# Patient Record
Sex: FEMALE | Race: WHITE | NOT HISPANIC OR LATINO | Employment: UNEMPLOYED | URBAN - METROPOLITAN AREA
[De-identification: names, ages, dates, MRNs, and addresses within clinical notes are randomized per-mention and may not be internally consistent; named-entity substitution may affect disease eponyms.]

---

## 2021-06-16 ENCOUNTER — TELEPHONE (OUTPATIENT)
Dept: OTOLARYNGOLOGY | Facility: CLINIC | Age: 66
End: 2021-06-16

## 2021-06-16 ENCOUNTER — OFFICE VISIT (OUTPATIENT)
Dept: OTOLARYNGOLOGY | Facility: CLINIC | Age: 66
End: 2021-06-16
Payer: MEDICARE

## 2021-06-16 ENCOUNTER — TELEPHONE (OUTPATIENT)
Dept: NEUROSURGERY | Facility: CLINIC | Age: 66
End: 2021-06-16

## 2021-06-16 DIAGNOSIS — D33.3 ACOUSTIC NEUROMA: Primary | ICD-10-CM

## 2021-06-16 PROCEDURE — 99205 PR OFFICE/OUTPT VISIT, NEW, LEVL V, 60-74 MIN: ICD-10-PCS | Mod: S$GLB,,, | Performed by: OTOLARYNGOLOGY

## 2021-06-16 PROCEDURE — 99999 PR PBB SHADOW E&M-EST. PATIENT-LVL I: CPT | Mod: PBBFAC,,, | Performed by: OTOLARYNGOLOGY

## 2021-06-16 PROCEDURE — 1159F MED LIST DOCD IN RCRD: CPT | Mod: S$GLB,,, | Performed by: OTOLARYNGOLOGY

## 2021-06-16 PROCEDURE — 1126F AMNT PAIN NOTED NONE PRSNT: CPT | Mod: S$GLB,,, | Performed by: OTOLARYNGOLOGY

## 2021-06-16 PROCEDURE — 1126F PR PAIN SEVERITY QUANTIFIED, NO PAIN PRESENT: ICD-10-PCS | Mod: S$GLB,,, | Performed by: OTOLARYNGOLOGY

## 2021-06-16 PROCEDURE — 99205 OFFICE O/P NEW HI 60 MIN: CPT | Mod: S$GLB,,, | Performed by: OTOLARYNGOLOGY

## 2021-06-16 PROCEDURE — 1159F PR MEDICATION LIST DOCUMENTED IN MEDICAL RECORD: ICD-10-PCS | Mod: S$GLB,,, | Performed by: OTOLARYNGOLOGY

## 2021-06-16 PROCEDURE — 99999 PR PBB SHADOW E&M-EST. PATIENT-LVL I: ICD-10-PCS | Mod: PBBFAC,,, | Performed by: OTOLARYNGOLOGY

## 2021-06-18 ENCOUNTER — TELEPHONE (OUTPATIENT)
Dept: NEUROSURGERY | Facility: CLINIC | Age: 66
End: 2021-06-18

## 2021-06-22 ENCOUNTER — OFFICE VISIT (OUTPATIENT)
Dept: NEUROSURGERY | Facility: CLINIC | Age: 66
End: 2021-06-22
Payer: MEDICARE

## 2021-06-22 ENCOUNTER — TELEPHONE (OUTPATIENT)
Dept: NEUROSURGERY | Facility: CLINIC | Age: 66
End: 2021-06-22

## 2021-06-22 DIAGNOSIS — D33.3 ACOUSTIC NEUROMA: ICD-10-CM

## 2021-06-22 PROCEDURE — 1159F PR MEDICATION LIST DOCUMENTED IN MEDICAL RECORD: ICD-10-PCS | Mod: S$GLB,,, | Performed by: NEUROLOGICAL SURGERY

## 2021-06-22 PROCEDURE — 99999 PR PBB SHADOW E&M-EST. PATIENT-LVL III: CPT | Mod: PBBFAC,,, | Performed by: NEUROLOGICAL SURGERY

## 2021-06-22 PROCEDURE — 1101F PR PT FALLS ASSESS DOC 0-1 FALLS W/OUT INJ PAST YR: ICD-10-PCS | Mod: CPTII,S$GLB,, | Performed by: NEUROLOGICAL SURGERY

## 2021-06-22 PROCEDURE — 99204 OFFICE O/P NEW MOD 45 MIN: CPT | Mod: S$GLB,,, | Performed by: NEUROLOGICAL SURGERY

## 2021-06-22 PROCEDURE — 99999 PR PBB SHADOW E&M-EST. PATIENT-LVL III: ICD-10-PCS | Mod: PBBFAC,,, | Performed by: NEUROLOGICAL SURGERY

## 2021-06-22 PROCEDURE — 1101F PT FALLS ASSESS-DOCD LE1/YR: CPT | Mod: CPTII,S$GLB,, | Performed by: NEUROLOGICAL SURGERY

## 2021-06-22 PROCEDURE — 1159F MED LIST DOCD IN RCRD: CPT | Mod: S$GLB,,, | Performed by: NEUROLOGICAL SURGERY

## 2021-06-22 PROCEDURE — 99204 PR OFFICE/OUTPT VISIT, NEW, LEVL IV, 45-59 MIN: ICD-10-PCS | Mod: S$GLB,,, | Performed by: NEUROLOGICAL SURGERY

## 2021-06-22 PROCEDURE — 1126F PR PAIN SEVERITY QUANTIFIED, NO PAIN PRESENT: ICD-10-PCS | Mod: S$GLB,,, | Performed by: NEUROLOGICAL SURGERY

## 2021-06-22 PROCEDURE — 3288F FALL RISK ASSESSMENT DOCD: CPT | Mod: CPTII,S$GLB,, | Performed by: NEUROLOGICAL SURGERY

## 2021-06-22 PROCEDURE — 1126F AMNT PAIN NOTED NONE PRSNT: CPT | Mod: S$GLB,,, | Performed by: NEUROLOGICAL SURGERY

## 2021-06-22 PROCEDURE — 3288F PR FALLS RISK ASSESSMENT DOCUMENTED: ICD-10-PCS | Mod: CPTII,S$GLB,, | Performed by: NEUROLOGICAL SURGERY

## 2021-06-22 RX ORDER — LOSARTAN POTASSIUM 100 MG/1
100 TABLET ORAL
COMMUNITY
Start: 2021-03-18 | End: 2022-03-18

## 2021-06-22 RX ORDER — ALBUTEROL SULFATE 90 UG/1
2 AEROSOL, METERED RESPIRATORY (INHALATION) EVERY 4 HOURS PRN
COMMUNITY
Start: 2021-03-18

## 2021-07-14 ENCOUNTER — OUTSIDE PLACE OF SERVICE (OUTPATIENT)
Dept: NEUROSURGERY | Facility: CLINIC | Age: 66
End: 2021-07-14
Payer: MEDICARE

## 2021-07-14 PROCEDURE — 61800 PR APPLY STEREOTACTIC HEADFRAME FOR RADIOSURGERY: ICD-10-PCS | Mod: ,,, | Performed by: NEUROLOGICAL SURGERY

## 2021-07-14 PROCEDURE — 61798 SRS CRANIAL LESION COMPLEX: CPT | Mod: ,,, | Performed by: NEUROLOGICAL SURGERY

## 2021-07-14 PROCEDURE — 61798 PR STEREOTACTIC RADIOSURGERY, CRANIAL,COMPLEX,SINGLE: ICD-10-PCS | Mod: ,,, | Performed by: NEUROLOGICAL SURGERY

## 2021-07-14 PROCEDURE — 61800 APPLY SRS HEADFRAME ADD-ON: CPT | Mod: ,,, | Performed by: NEUROLOGICAL SURGERY

## 2021-07-16 DIAGNOSIS — D33.3 ACOUSTIC NEUROMA: Primary | ICD-10-CM

## 2021-10-19 ENCOUNTER — OFFICE VISIT (OUTPATIENT)
Dept: NEUROSURGERY | Facility: CLINIC | Age: 66
End: 2021-10-19
Payer: MEDICARE

## 2021-10-19 ENCOUNTER — HOSPITAL ENCOUNTER (OUTPATIENT)
Dept: RADIOLOGY | Facility: HOSPITAL | Age: 66
Discharge: HOME | End: 2021-10-19
Attending: NEUROLOGICAL SURGERY
Payer: MEDICARE

## 2021-10-19 VITALS — HEART RATE: 75 BPM | DIASTOLIC BLOOD PRESSURE: 81 MMHG | SYSTOLIC BLOOD PRESSURE: 114 MMHG

## 2021-10-19 DIAGNOSIS — D33.3 ACOUSTIC NEUROMA: Primary | ICD-10-CM

## 2021-10-19 DIAGNOSIS — D33.3 ACOUSTIC NEUROMA: ICD-10-CM

## 2021-10-19 PROCEDURE — 3074F PR MOST RECENT SYSTOLIC BLOOD PRESSURE < 130 MM HG: ICD-10-PCS | Mod: CPTII,S$GLB,, | Performed by: NEUROLOGICAL SURGERY

## 2021-10-19 PROCEDURE — 1126F PR PAIN SEVERITY QUANTIFIED, NO PAIN PRESENT: ICD-10-PCS | Mod: CPTII,S$GLB,, | Performed by: NEUROLOGICAL SURGERY

## 2021-10-19 PROCEDURE — 99999 PR PBB SHADOW E&M-EST. PATIENT-LVL III: CPT | Mod: PBBFAC,,, | Performed by: NEUROLOGICAL SURGERY

## 2021-10-19 PROCEDURE — 25500020 PHARM REV CODE 255: Performed by: NEUROLOGICAL SURGERY

## 2021-10-19 PROCEDURE — 70553 MRI BRAIN W WO CONTRAST: ICD-10-PCS | Mod: 26,,, | Performed by: RADIOLOGY

## 2021-10-19 PROCEDURE — 1160F RVW MEDS BY RX/DR IN RCRD: CPT | Mod: CPTII,S$GLB,, | Performed by: NEUROLOGICAL SURGERY

## 2021-10-19 PROCEDURE — 4010F PR ACE/ARB THEARPY RXD/TAKEN: ICD-10-PCS | Mod: CPTII,S$GLB,, | Performed by: NEUROLOGICAL SURGERY

## 2021-10-19 PROCEDURE — 3074F SYST BP LT 130 MM HG: CPT | Mod: CPTII,S$GLB,, | Performed by: NEUROLOGICAL SURGERY

## 2021-10-19 PROCEDURE — 1160F PR REVIEW ALL MEDS BY PRESCRIBER/CLIN PHARMACIST DOCUMENTED: ICD-10-PCS | Mod: CPTII,S$GLB,, | Performed by: NEUROLOGICAL SURGERY

## 2021-10-19 PROCEDURE — 99213 PR OFFICE/OUTPT VISIT, EST, LEVL III, 20-29 MIN: ICD-10-PCS | Mod: S$GLB,,, | Performed by: NEUROLOGICAL SURGERY

## 2021-10-19 PROCEDURE — 1159F PR MEDICATION LIST DOCUMENTED IN MEDICAL RECORD: ICD-10-PCS | Mod: CPTII,S$GLB,, | Performed by: NEUROLOGICAL SURGERY

## 2021-10-19 PROCEDURE — 99213 OFFICE O/P EST LOW 20 MIN: CPT | Mod: S$GLB,,, | Performed by: NEUROLOGICAL SURGERY

## 2021-10-19 PROCEDURE — 99999 PR PBB SHADOW E&M-EST. PATIENT-LVL III: ICD-10-PCS | Mod: PBBFAC,,, | Performed by: NEUROLOGICAL SURGERY

## 2021-10-19 PROCEDURE — 4010F ACE/ARB THERAPY RXD/TAKEN: CPT | Mod: CPTII,S$GLB,, | Performed by: NEUROLOGICAL SURGERY

## 2021-10-19 PROCEDURE — 70553 MRI BRAIN STEM W/O & W/DYE: CPT | Mod: TC

## 2021-10-19 PROCEDURE — 1126F AMNT PAIN NOTED NONE PRSNT: CPT | Mod: CPTII,S$GLB,, | Performed by: NEUROLOGICAL SURGERY

## 2021-10-19 PROCEDURE — 3079F DIAST BP 80-89 MM HG: CPT | Mod: CPTII,S$GLB,, | Performed by: NEUROLOGICAL SURGERY

## 2021-10-19 PROCEDURE — 3079F PR MOST RECENT DIASTOLIC BLOOD PRESSURE 80-89 MM HG: ICD-10-PCS | Mod: CPTII,S$GLB,, | Performed by: NEUROLOGICAL SURGERY

## 2021-10-19 PROCEDURE — A9585 GADOBUTROL INJECTION: HCPCS | Performed by: NEUROLOGICAL SURGERY

## 2021-10-19 PROCEDURE — 1159F MED LIST DOCD IN RCRD: CPT | Mod: CPTII,S$GLB,, | Performed by: NEUROLOGICAL SURGERY

## 2021-10-19 PROCEDURE — 70553 MRI BRAIN STEM W/O & W/DYE: CPT | Mod: 26,,, | Performed by: RADIOLOGY

## 2021-10-19 RX ORDER — GADOBUTROL 604.72 MG/ML
10 INJECTION INTRAVENOUS
Status: COMPLETED | OUTPATIENT
Start: 2021-10-19 | End: 2021-10-19

## 2021-10-19 RX ADMIN — GADOBUTROL 10 ML: 604.72 INJECTION INTRAVENOUS at 11:10

## 2021-10-20 LAB
CREAT SERPL-MCNC: 1 MG/DL (ref 0.5–1.4)
SAMPLE: NORMAL

## 2022-09-30 ENCOUNTER — TELEPHONE (OUTPATIENT)
Dept: NEUROSURGERY | Facility: CLINIC | Age: 67
End: 2022-09-30
Payer: MEDICARE

## 2022-09-30 DIAGNOSIS — D33.3 ACOUSTIC NEUROMA: Primary | ICD-10-CM

## 2022-09-30 NOTE — TELEPHONE ENCOUNTER
Patient has been scheduled for imaging appt as well as f/u . Attempted to contact patient to discuss, however voicemail was not set up .   Mailing out appointment reminder today .             ----- Message from Barbara Latif sent at 9/30/2022 12:27 PM CDT -----  Contact: Patient   Type:  Appointment Request      Name of Caller:Patient    Symptoms:follow up / last mri 10/2021  Would the patient rather a call back or a response via [facility]? Call back   Best Call Back Number:[phone]  Additional Information: Please assist

## 2022-10-06 ENCOUNTER — TELEPHONE (OUTPATIENT)
Dept: NEUROSURGERY | Facility: CLINIC | Age: 67
End: 2022-10-06
Payer: MEDICARE

## 2022-10-06 NOTE — TELEPHONE ENCOUNTER
Called pt S/W . Gave him appt schedule. V/U & thx.    ----- Message from Suyapa Perez sent at 10/6/2022 11:14 AM CDT -----  Regarding: appt  Contact: @ [phone] or [phone]  Caller, Tye is requesting an appt for Pt ( wife). Please call to discuss further.

## 2022-10-31 ENCOUNTER — TELEPHONE (OUTPATIENT)
Dept: NEUROSURGERY | Facility: CLINIC | Age: 67
End: 2022-10-31
Payer: MEDICARE

## 2022-10-31 NOTE — TELEPHONE ENCOUNTER
"Returned pt's call  Pt c/o "discomfort" left upper cheek and down neck. Explained why it was unlikely related to the AN she was treated for.\\States she had a bad concussion 6 yrs ago. Explained NSGY does not treat concussions.    Recommended she contact her PCP about the current symptoms. Pt due to see us in 2 wks anyway.    Encouraged patient to call with any further questions or concerns.      ----- Message from Cyndee Klein sent at 10/31/2022  2:58 PM CDT -----  Regarding: Pain  Contact: pt @ [phone]  Pt is calling to speak with nurse about pain in jaw and side. Asking for urgent call back    "

## 2022-11-15 ENCOUNTER — HOSPITAL ENCOUNTER (OUTPATIENT)
Dept: RADIOLOGY | Facility: HOSPITAL | Age: 67
Discharge: HOME | End: 2022-11-15
Attending: NEUROLOGICAL SURGERY
Payer: MEDICARE

## 2022-11-15 ENCOUNTER — OFFICE VISIT (OUTPATIENT)
Dept: NEUROSURGERY | Facility: CLINIC | Age: 67
End: 2022-11-15
Payer: MEDICARE

## 2022-11-15 VITALS
HEART RATE: 94 BPM | DIASTOLIC BLOOD PRESSURE: 72 MMHG | SYSTOLIC BLOOD PRESSURE: 119 MMHG | BODY MASS INDEX: 39.27 KG/M2 | WEIGHT: 230 LBS | OXYGEN SATURATION: 98 % | HEIGHT: 64 IN

## 2022-11-15 DIAGNOSIS — R41.3 MEMORY DEFICIT: ICD-10-CM

## 2022-11-15 DIAGNOSIS — R29.6 FREQUENT FALLS: ICD-10-CM

## 2022-11-15 DIAGNOSIS — S06.0X0A CONCUSSION WITHOUT LOSS OF CONSCIOUSNESS, INITIAL ENCOUNTER: ICD-10-CM

## 2022-11-15 DIAGNOSIS — D33.3 ACOUSTIC NEUROMA: Primary | ICD-10-CM

## 2022-11-15 DIAGNOSIS — R29.898 WEAKNESS OF BOTH UPPER EXTREMITIES: ICD-10-CM

## 2022-11-15 DIAGNOSIS — D33.3 ACOUSTIC NEUROMA: ICD-10-CM

## 2022-11-15 PROCEDURE — 99999 PR PBB SHADOW E&M-EST. PATIENT-LVL IV: ICD-10-PCS | Mod: PBBFAC,,, | Performed by: NEUROLOGICAL SURGERY

## 2022-11-15 PROCEDURE — 3074F SYST BP LT 130 MM HG: CPT | Mod: CPTII,S$GLB,, | Performed by: NEUROLOGICAL SURGERY

## 2022-11-15 PROCEDURE — 3074F PR MOST RECENT SYSTOLIC BLOOD PRESSURE < 130 MM HG: ICD-10-PCS | Mod: CPTII,S$GLB,, | Performed by: NEUROLOGICAL SURGERY

## 2022-11-15 PROCEDURE — 1160F PR REVIEW ALL MEDS BY PRESCRIBER/CLIN PHARMACIST DOCUMENTED: ICD-10-PCS | Mod: CPTII,S$GLB,, | Performed by: NEUROLOGICAL SURGERY

## 2022-11-15 PROCEDURE — 25500020 PHARM REV CODE 255: Performed by: NEUROLOGICAL SURGERY

## 2022-11-15 PROCEDURE — 3008F BODY MASS INDEX DOCD: CPT | Mod: CPTII,S$GLB,, | Performed by: NEUROLOGICAL SURGERY

## 2022-11-15 PROCEDURE — 99214 OFFICE O/P EST MOD 30 MIN: CPT | Mod: S$GLB,,, | Performed by: NEUROLOGICAL SURGERY

## 2022-11-15 PROCEDURE — 1126F PR PAIN SEVERITY QUANTIFIED, NO PAIN PRESENT: ICD-10-PCS | Mod: CPTII,S$GLB,, | Performed by: NEUROLOGICAL SURGERY

## 2022-11-15 PROCEDURE — 3078F DIAST BP <80 MM HG: CPT | Mod: CPTII,S$GLB,, | Performed by: NEUROLOGICAL SURGERY

## 2022-11-15 PROCEDURE — 4010F ACE/ARB THERAPY RXD/TAKEN: CPT | Mod: CPTII,S$GLB,, | Performed by: NEUROLOGICAL SURGERY

## 2022-11-15 PROCEDURE — A9585 GADOBUTROL INJECTION: HCPCS | Performed by: NEUROLOGICAL SURGERY

## 2022-11-15 PROCEDURE — 99214 PR OFFICE/OUTPT VISIT, EST, LEVL IV, 30-39 MIN: ICD-10-PCS | Mod: S$GLB,,, | Performed by: NEUROLOGICAL SURGERY

## 2022-11-15 PROCEDURE — 70553 MRI BRAIN W WO CONTRAST: ICD-10-PCS | Mod: 26,,, | Performed by: RADIOLOGY

## 2022-11-15 PROCEDURE — 3078F PR MOST RECENT DIASTOLIC BLOOD PRESSURE < 80 MM HG: ICD-10-PCS | Mod: CPTII,S$GLB,, | Performed by: NEUROLOGICAL SURGERY

## 2022-11-15 PROCEDURE — 1160F RVW MEDS BY RX/DR IN RCRD: CPT | Mod: CPTII,S$GLB,, | Performed by: NEUROLOGICAL SURGERY

## 2022-11-15 PROCEDURE — 99999 PR PBB SHADOW E&M-EST. PATIENT-LVL IV: CPT | Mod: PBBFAC,,, | Performed by: NEUROLOGICAL SURGERY

## 2022-11-15 PROCEDURE — 4010F PR ACE/ARB THEARPY RXD/TAKEN: ICD-10-PCS | Mod: CPTII,S$GLB,, | Performed by: NEUROLOGICAL SURGERY

## 2022-11-15 PROCEDURE — 1126F AMNT PAIN NOTED NONE PRSNT: CPT | Mod: CPTII,S$GLB,, | Performed by: NEUROLOGICAL SURGERY

## 2022-11-15 PROCEDURE — 70553 MRI BRAIN STEM W/O & W/DYE: CPT | Mod: TC

## 2022-11-15 PROCEDURE — 3008F PR BODY MASS INDEX (BMI) DOCUMENTED: ICD-10-PCS | Mod: CPTII,S$GLB,, | Performed by: NEUROLOGICAL SURGERY

## 2022-11-15 PROCEDURE — 1159F MED LIST DOCD IN RCRD: CPT | Mod: CPTII,S$GLB,, | Performed by: NEUROLOGICAL SURGERY

## 2022-11-15 PROCEDURE — 70553 MRI BRAIN STEM W/O & W/DYE: CPT | Mod: 26,,, | Performed by: RADIOLOGY

## 2022-11-15 PROCEDURE — 1159F PR MEDICATION LIST DOCUMENTED IN MEDICAL RECORD: ICD-10-PCS | Mod: CPTII,S$GLB,, | Performed by: NEUROLOGICAL SURGERY

## 2022-11-15 RX ORDER — GADOBUTROL 604.72 MG/ML
10 INJECTION INTRAVENOUS
Status: COMPLETED | OUTPATIENT
Start: 2022-11-15 | End: 2022-11-15

## 2022-11-15 RX ADMIN — GADOBUTROL 10 ML: 604.72 INJECTION INTRAVENOUS at 11:11

## 2022-11-15 NOTE — PROGRESS NOTES
"Subjective:   I, Nancy Armstrong, attest that this documentation has been prepared under the direction and in the presence of Mervin Ramos MD.     Patient ID: Kassy Delgado is a 67 y.o. female     Chief Complaint: No chief complaint on file.      HPI  Ms. Kassy Delgado is a 67 y.o. woman with acoustic neuroma s/p GSRS (14 Gy to the 50% isodose line) done on 7/14/21, who presents today for her 1 year follow up with MRI brain. This is a pt who presented to me with dizziness that has been progressively worsening and decreasing hearing loss in the left ear. She also then developed numbness to the left side of her face mostly to her cheek and the tongue. MRI brain showed 1.7 mm acoustic neuroma on the left. At the time of our last clinic visit on 10/19/2021, the pt reports she continues to have dizziness that only occurs intermittently. States she has issues with her knee which is affecting her gait as well. Denies having any pain at this time.     Today the pt reports dizziness with secondary difficulty swallowing. Her  reports the dizziness is exacerbated with PT. She unfortunately sustained a concussion 6 years ago at which time the pt had loss of vision for approximately 1 week. She also reports a memory deficit and endorses difficulty with day to day actions. Additionally, pt endorses weakness to the bilateral hands (R>L). Of note, the pt notes intermittent tremors and uncontrollable motions. Described as though she is "falling all the time". She is feeling more anxious due to the reoccurring fear of falling.    Review of Systems   Constitutional:  Negative for activity change, appetite change, fatigue, fever and unexpected weight change.   HENT:  Positive for trouble swallowing. Negative for facial swelling.    Eyes: Negative.    Respiratory: Negative.     Cardiovascular: Negative.    Gastrointestinal:  Negative for diarrhea, nausea and vomiting.   Endocrine: Negative.  "   Genitourinary: Negative.    Musculoskeletal:  Negative for back pain, joint swelling, myalgias and neck pain.   Neurological:  Positive for dizziness, tremors and weakness. Negative for seizures, numbness and headaches.        + memory deficit   Psychiatric/Behavioral:  The patient is nervous/anxious.       Past Medical History:   Diagnosis Date    Acoustic neuroma     Arthritis        Objective:      Vitals:    11/15/22 1252   BP: 119/72   Pulse: 94      Physical Exam  Constitutional:       General: She is not in acute distress.     Appearance: Normal appearance.   HENT:      Head: Normocephalic and atraumatic.   Pulmonary:      Effort: Pulmonary effort is normal.   Musculoskeletal:      Cervical back: Neck supple.   Neurological:      Mental Status: She is alert and oriented to person, place, and time.      GCS: GCS eye subscore is 4. GCS verbal subscore is 5. GCS motor subscore is 6.      Cranial Nerves: No cranial nerve deficit.      Deep Tendon Reflexes: Reflexes abnormal (hyperreflexia).     IMAGING:  MRI Brain W WO Contrast (11/15/2022):  Stable size with decreased enhancement in the previously treated left IAC/CP angle cistern extra-axial mass. No new mass effect or subjacent parenchymal edema.    I have personally reviewed the images with the pt.      I, Dr. Mervin Ramos, personally performed the services described in this documentation. All medical record entries made by the scribe, Nancy Armstrong, were at my direction and in my presence.  I have reviewed the chart and agree that the record reflects my personal performance and is accurate and complete. Mervin Ramos MD. 11/15/2022    Assessment:       Acoustic neuroma.       Plan:   I have personally reviewed the MRI brain with the pt which shows stable size with decreased enhancement in the previously treated left IAC/CP angle cistern extra-axial mass. No new mass effect or subjacent parenchymal edema.    I will refer the pt to a concussion specialist. I  will schedule the patient for neuro-psychology evaluation and MRI cervical spine. I will follow up thereafter.

## 2022-11-15 NOTE — PATIENT INSTRUCTIONS
I have personally reviewed the MRI brain with the pt which shows stable size with decreased enhancement in the previously treated left IAC/CP angle cistern extra-axial mass. No new mass effect or subjacent parenchymal edema.    I will refer the pt to a concussion specialist. I will schedule the patient for neuro-psychology evaluation and MRI cervical spine. I will follow up thereafter.

## 2022-12-06 ENCOUNTER — HOSPITAL ENCOUNTER (OUTPATIENT)
Dept: RADIOLOGY | Facility: HOSPITAL | Age: 67
Discharge: HOME | End: 2022-12-06
Attending: NEUROLOGICAL SURGERY
Payer: MEDICARE

## 2022-12-06 DIAGNOSIS — R29.6 FREQUENT FALLS: ICD-10-CM

## 2022-12-06 DIAGNOSIS — R29.898 WEAKNESS OF BOTH UPPER EXTREMITIES: ICD-10-CM

## 2022-12-06 DIAGNOSIS — S06.0X0A CONCUSSION WITHOUT LOSS OF CONSCIOUSNESS, INITIAL ENCOUNTER: ICD-10-CM

## 2022-12-06 PROCEDURE — 72141 MRI NECK SPINE W/O DYE: CPT | Mod: 26,,, | Performed by: RADIOLOGY

## 2022-12-06 PROCEDURE — 72141 MRI NECK SPINE W/O DYE: CPT | Mod: TC

## 2022-12-06 PROCEDURE — 72141 MRI CERVICAL SPINE WITHOUT CONTRAST: ICD-10-PCS | Mod: 26,,, | Performed by: RADIOLOGY

## 2023-01-23 ENCOUNTER — TELEPHONE (OUTPATIENT)
Dept: NEUROLOGY | Facility: CLINIC | Age: 68
End: 2023-01-23
Payer: MEDICARE

## 2023-02-02 DIAGNOSIS — F07.81 POST-CONCUSSION SYNDROME: Primary | ICD-10-CM

## 2023-02-06 ENCOUNTER — OFFICE VISIT (OUTPATIENT)
Dept: NEUROLOGY | Facility: CLINIC | Age: 68
End: 2023-02-06
Payer: MEDICARE

## 2023-02-06 DIAGNOSIS — F98.8 ATTENTION DEFICIT DISORDER, UNSPECIFIED HYPERACTIVITY PRESENCE: ICD-10-CM

## 2023-02-06 DIAGNOSIS — R41.3 MEMORY DEFICIT: Primary | ICD-10-CM

## 2023-02-06 PROCEDURE — 96116 PR NEUROBEHAVIORAL STATUS EXAM BY PSYCH/PHYS: ICD-10-PCS | Mod: FQ,95,, | Performed by: CLINICAL NEUROPSYCHOLOGIST

## 2023-02-06 PROCEDURE — 96116 NUBHVL XM PHYS/QHP 1ST HR: CPT | Mod: FQ,95,, | Performed by: CLINICAL NEUROPSYCHOLOGIST

## 2023-02-06 PROCEDURE — 99499 NO LOS: ICD-10-PCS | Mod: 95,,, | Performed by: CLINICAL NEUROPSYCHOLOGIST

## 2023-02-06 PROCEDURE — 4010F PR ACE/ARB THEARPY RXD/TAKEN: ICD-10-PCS | Mod: CPTII,95,, | Performed by: CLINICAL NEUROPSYCHOLOGIST

## 2023-02-06 PROCEDURE — 99499 UNLISTED E&M SERVICE: CPT | Mod: 95,,, | Performed by: CLINICAL NEUROPSYCHOLOGIST

## 2023-02-06 PROCEDURE — 4010F ACE/ARB THERAPY RXD/TAKEN: CPT | Mod: CPTII,95,, | Performed by: CLINICAL NEUROPSYCHOLOGIST

## 2023-02-06 NOTE — PROGRESS NOTES
[facility]  Initial Neurological Evaluation Post Concussion  ***CONCUSSION CLINIC     Date: 2/7/2023  Patient: Kassy Delgado  Chart Number: [id number]    Therapy Diagnosis: No diagnosis found.  Physician: Hoang Ferguson III, MD    Physician Orders: OT eval and treat  Medical Diagnosis: F07.81 (ICD-10-CM) - Post-concussion syndrome   Evaluation Date: 2/7/2023  Plan of Care Expiration Period: ***  Insurance Authorization period Expiration: ***  Date of Return to MD: ***  Visit # / Visits Authorized: *** / ***  FOTO: ***/***    Time In:***  Time Out: ***  Total Billable (one on one) Time: *** minutes    Precautions: {IP WOUND PRECAUTIONS OHS:34492}    Subjective     History of Current Condition: Kassy Delgado is a 67 y.o. {MALE/FEMALE:63845} who presents to [facility] for evaluation and treatment secondary to ***. Pt's concussion occurred in *** after ***. Pt reported initial symptoms included ***. Currently, pt is complaining of ***. Deficits***. PMHx: {+ OR -:13119} prior concussions; {+ OR -:57159} personal history of headaches/migraines; {+ OR -:11309} familial history of headaches/migraines; {+ OR -:96483} depression/anxiety; {+ OR -:62125} ADD/ADHD; {+ OR -:00224} learning disability; {+ OR -:82271} neck/shoulder/back pain prior to injury.       HPI  Ms. Kassy Delgado is a 67 y.o. woman with acoustic neuroma s/p GSRS (14 Gy to the 50% isodose line) done on 7/14/21, who presents today for her 1 year follow up with MRI brain. This is a pt who presented to me with dizziness that has been progressively worsening and decreasing hearing loss in the left ear. She also then developed numbness to the left side of her face mostly to her cheek and the tongue. MRI brain showed 1.7 mm acoustic neuroma on the left. At the time of our last clinic visit on 10/19/2021, the pt reports she continues to have  "dizziness that only occurs intermittently. States she has issues with her knee which is affecting her gait as well. Denies having any pain at this time.      Today the pt reports dizziness with secondary difficulty swallowing. Her  reports the dizziness is exacerbated with PT. She unfortunately sustained a concussion 6 years ago at which time the pt had loss of vision for approximately 1 week. She also reports a memory deficit and endorses difficulty with day to day actions. Additionally, pt endorses weakness to the bilateral hands (R>L). Of note, the pt notes intermittent tremors and uncontrollable motions. Described as though she is "falling all the time". She is feeling more anxious due to the reoccurring fear of falling.    Involved Side: ***  Dominant Side: { hand dominance:8057514026}  Date of Onset: ***  Surgical Procedure: ***  Imaging: MRI Brain W WO Contrast (11/15/2022):  Stable size with decreased enhancement in the previously treated left IAC/CP angle cistern extra-axial mass. No new mass effect or subjacent parenchymal edema.     Previous Therapy: ***    Past Medical History/Physical Systems Review:     Past Medical History:  Kassy Delgado  has a past medical history of Acoustic neuroma and Arthritis.    Past Surgical History:  Kassy Delgado  has a past surgical history that includes bilateral shoulder arthroplasty; right hip arthroplasty; right knee arthroplasty; Rewiring of sternum; and Hysterectomy.    Current Medications:  Kassy has a current medication list which includes the following prescription(s): albuterol and losartan.    Allergies:  Review of patient's allergies indicates:   Allergen Reactions    Dicloxacillin Shortness Of Breath    Apazone Other (See Comments)    Iodine Rash        Other: ***    Patient's Goals for Therapy: ***    Pain:  Pain Related Behaviors Observed: {YES/NO:20267} ***  Functional Pain Scale Rating 0-10:   Location: Headache  {NUMBERS " 1-10:06101}/10 on average  {NUMBERS 1-10:47930}/10 at best  {NUMBERS 1-10:49513}/10 at worst  Description: {Pain Description:07935}  Aggravating Factors: {Causes; Pain:18168}  Easing Factors: {Pain (activities that relieve):92399}  Location: Neck/back/shoulder   {NUMBERS 1-10:38846}/10 on average  {NUMBERS 1-10:25952}/10 at best  {NUMBERS 1-10:92978}/10 at worst  Description: {Pain Description:63290}  Aggravating Factors: {Causes; Pain:00829}  Easing Factors: {Pain (activities that relieve):37442}    Occupation:  ***  Working presently: {Work history:41156}  Duties: ***    Functional Limitations/Social History:    Prior Level of Function: ***  Current Level of Function:***  ADLs/IADLs:  Feeding: {FIM List:39974} ***  Bathing: {FIM List:65500}  ***  Dressing/Grooming:  {FIM List:51723} ***  Cooking/Homecare: {FIM List:10521} ***  Computer/phone use: {FIM List:91633} ***  Reading: {NO, YES PAST, YES CURRENT:17245} ***  Functional Mobility:  Bed mobility: {AMB OT NEURO ASSISTANCE:08582}  Roll to left: {AMB OT NEURO ASSISTANCE:94116}  Roll to right: {AMB OT NEURO ASSISTANCE:95059}  Supine to sit: {AMB OT NEURO ASSISTANCE:57765}  Sit to supine: {AMB OT NEURO ASSISTANCE:74504}  Transfers to bed: {AMB OT NEURO ASSISTANCE:87572}  Transfers to toilet: {AMB OT NEURO ASSISTANCE:52153}  Car transfers: {AMB OT NEURO ASSISTANCE:19608}  Wheelchair mobility: {AMB OT NEURO ASSISTANCE:71052}    Home/Living environment : {LIVES WITH:73477}  Home Access: ***  DME: {DME OWNED:44852}     Leisure: {AMB OT HAND LEISURE:83986}    Driving: ***    Adult Vision Questionnaire:   Directions: please check the answer that best describes your situation. If you wear glasses or contact lenses, answer the questions assuming that you are wearing them.   Never = never  Occasionally = less than 1 time/week  Frequently = at least 1 time/week  Always = everyday     Do you have headaches or facial pain? {adult vision questionnarie:14397}  Do you have pain  in your eyes with eye movement? {adult vision questionnarie:68389}  Do you experience neck or shoulder discomfort? {adult vision questionnarie:56466}  Do you have dizziness, light headed or nausea while performing close up work? (computer work; reading; writing) {adult vision questionnarie:64476}  Do you have dizziness, light headed or nausea while performing far distance activities? (driving, tv, movies) {adult vision questionnarie:25871}  Do you experience dizziness when bending down and standing back up, or when getting up quickly? {adult vision questionnarie:17841}  Do you feel unsteady with walking, or drift to one side? {adult vision questionnarie:52103}  Do you feel overwhelmed or anxious while walking in a large department store or walking in a crowd? {adult vision questionnarie:74464}  Do you feel dizzy or off balance when walking down a long hallway or on bold patterned carpeting? {adult vision questionnarie:86560}  Does riding in a car make you feel dizzy or uncomfortable? {adult vision questionnarie:51947}  Do you find yourself with your head tilted to one side? {adult vision questionnarie:67710}  Does your posture tend to be leaning more forward or backward than your used to? {adult vision questionnarie:21976}  Do you experience poor depth perception or have difficulty estimating distances accurately? {adult vision questionnarie:44339}  Do you experience double/overlapping/shadowed vision at far distances? {adult vision questionnarie:72501}  Do you experience double/overlapping/shadowed vision at near distances? {adult vision questionnarie:65688}  Do you experience glare or have sensitivity to bright lights? {adult vision questionnarie:84805}  Do you close or cover one eye with near or far tasks? {adult vision questionnarie:06608}  Do you skip lines or lose your place while reading? {adult vision questionnarie:77176}  Do you use your finger to keep your place on the page? {adult vision  "questionnarie:30307}  Do you tire easily with close-up tasks? {adult vision questionnarie:27351}  Do you experience blurred vision with far distance tasks? (driving, television, movies, chalkboard at school) {adult vision questionnarie:37920}  Do you experience blurred vision with close up tasks? (computer, reading) {adult vision questionnarie:52785}  Do you experience words running together or appearing to move on the page? {adult vision questionnarie:60633}    History:  Have you ever been diagnosed with:  Traumatic brain injury (TBI) or concussion? {YES/NO:20267}  Reading disability? {YES/NO:20267}  Lazy eye? {YES/NO:20267}  Have you ever had an eye operation? {YES/NO:20267}    Dizziness Handicap Inventory: ***/100   16-34= mild   36-52= moderate   >/= 54= severe    Objective     Cognitive Exam  Oriented: {AMB OT NEURO COGNITIVE ORIENTED:59250}  Behaviors: {exam; behavior :12208::"normal","cooperative"}  Follows Commands/attention: {AMB OT NEURO COGNITIVE FOLLOWS COMMANDS:70473}  Communication: {AMB OT NEURO COGNITIVE COMMUNICATION:58928}  Memory: {AMB OT NEURO COGNITIVE MEMORY:28489}  Short Blessed (>10 cognitive impairment):  {Numbers; 1-28:07660} /28   Bearden Cognitive assessment: (less than 26/30 abnormal) {NUMBERS - 1-30:15776}/30  ***  Safety awareness/insight to disability: {ONC PSO AWARENESS:92805}  Coping skills/emotional control: {AMB OT NEURO COGNITIVE COPING SKILLS:00952}  Comments: See speech therapy evaluation for formal cognitive assessments***    Physical Exam  Head Control/Neck Mobility: ***; see PT eval for formal assessments    Oculomotor Exam  Vestibular/Ocular-Motor Screening (VOMS) for Concussion  Visual/ Ocular Motor Test:  Not Tested Headache (0-10) Dizziness (0-10) Nausea (0-10) Fogginess (0-10) Comments    Baseline N/A *** *** *** ***    Smooth pursuits   (1.5 feet left/right of center; 2 times; 30 bpm each direction; horizontal and vertical)  *** *** *** ***    Saccades- horizontal " "(1.5 feet left/right of center; 3 feet away; 10 times; no specific speed)  *** *** *** ***    Saccades- vertical   (1.5 feet up/down of center; 3 feet away; 10 times; no specific speed)  *** *** *** ***    Convergence   (14 pt font; normal 5 cm)  *** *** *** *** Near point (cm):   1.  2.  3.   VOR- horizontal   (14 pt font; 20 degrees rotation left/right; 10 reps; 180 bpm)  *** *** *** *** ***   VOR- vertical   (14 pt font; 20 degrees rotation up/down; 10 times; 180 bpm)  *** *** *** *** ***   Visual motion sensitivity test   (80 degrees left/right; 5 times each direction; 50 bpm)  *** *** *** *** ***     Oculomotor ROM: ***  Eye Alignment: ***  Visual Field: ***  Acuity: {visual acuity OT:09665}    Spontaneous Nystagmus: ***  Gaze Holding Nystagmus: ***  Gaze Holding (No Fixation): NT  Smooth Pursuits: ***  Horizontal: ***   Vertical: ***  Saccades: ***   Horizontal: ***   Vertical: ***  Near Point Convergence (cm): ***   Accommodation (gaze shift between near target (16") and far target (10ft)): ***   Fixation (5 second sustained visual attention): ***    VOR Slow Head Movement: ***  Head Thrust: ***  Dynamic Visual Acuity (DVA) (using *** chart): *** line difference (***, ***)   Head Shake: ***  Cover/Cross Cover: ***  Cover/Uncover: ***    CMS Impairment/Limitation/Restriction for FOTO *** Survey    Therapist reviewed FOTO scores for Kassy Delgado on 2/7/2023.   FOTO documents entered into EPIC - see Media section.    Limitation Score: ***%     Treatment     Treatment Time In: ***  Treatment Time Out: ***  Total Treatment time separate from Evaluation time:***    Kassy participated in dynamic functional therapeutic activities to improve functional performance for ***  minutes, including:  ***    Kassy participated in neuromuscular re-education activities to improve: {AMB PT PROGRESS NEURO RE-ED:09804} for *** minutes. The following activities were included:  ***    Kassy received therapeutic " "exercises to develop {AMB PT PROGRESS OBJECTIVE:56343} for *** minutes including:  ***    Kassy received the following manual therapy techniques: {AMB PT PROGRESS MANUAL THERAPY:17858} were applied to the: *** for *** minutes, including:  ***    Kassy received the following supervised modalities after being cleared for contradictions: {AMB PT SUPERVISED MODES:54347}  ***    Kassy received the following direct contact modalities after being cleared for contraindications: {AMB PT PROGRESS DIRECT CONTACT MODES:27483}  ***  Kassy participated in dynamic functional self care to improve ADL and IADL tasks for ***  minutes, including:  -***     Home Exercises and Patient Education Provided    Education provided:   - Role of OT, goals for OT, scheduling/cancellations, insurance limitations with patient.  - Additional Education provided: ***    Written Home Exercises Provided: {Blank single:85788::"yes","Patient instructed to cont prior HEP"}.  Exercises were reviewed and Kassy was able to demonstrate them prior to the end of the session.    Kassy demonstrated {Desc; good/fair/poor:63831} understanding of the education provided.     See EMR under {Blank single:64003::"Media","Patient Instructions"} for exercises provided {Blank single:62770::"2/7/2023","prior visit"}.    Assessment     Kassy Delgado is a 67 y.o. female referred to outpatient occupational therapy and presents with a medical diagnosis of ***, resulting in {AMB OT NEURO IMPAIRMENTS:26931} and demonstrates limitations as described in the chart below. Pt also reported ***. obj*** *** Pt's score on the DHI indicates a '*** handicap'. Following medical record review it is determined that patient will benefit from occupational therapy services in order to maximize oculomotor functioning and gaze stabilization, habituation for desensitization to environments/movements that elicit/increase symptoms, pt education on mindfulness/relaxation strategies, and " HEP/HAP guidance to improve functional participation with meaningful occupations.***    Patient prognosis is {REHAB PROGNOSIS OHS:42485} due to  ***  Patient will benefit from skilled outpatient Occupational Therapy to address the deficits stated above and in the chart below, provide patient/family education, and to maximize patient's level of independence.     Plan of care discussed with patient: {YES:88384}  Patient's spiritual, cultural and educational needs considered and patient is agreeable to the plan of care and goals as stated below:     Anticipated Barriers for therapy: ***    Medical Necessity is demonstrated by the following  Profile and History Assessment of Occupational Performance Level of Clinical Decision Making Complexity Score   Occupational Profile:   Kassy Delgado is a 67 y.o. female who {LIVES WITH:45737} and is currently {Work history:97369} as ***. Kassy Delgado has difficulty with  {ADLs:63261}  {IADLs:64078}  affecting his/her daily functional abilities. His/her main goal for therapy is ***.     Comorbidities:   {Co-morbidities:06642}    Medical and Therapy History Review:   {History Review:92787}               Performance Deficits    Physical:  {Physical:57695}    Cognitive:  {Cognitive:17193}    Psychosocial:    {Psychosocial:58801}     Clinical Decision Making:  {Desc; low/moderate/high:912972}    Assessment Process:  {Assessment Complexity:83924}    Modification/Need for Assistance:  {Modification:59851}    Intervention Selection:  {Treatment Options:86406}       {Desc; low/moderate/high:625412}  Based on PMHX, co morbidities , data from assessments and functional level of assistance required with task and clinical presentation directly impacting function.       The following goals were discussed with the patient and patient is in agreement with them as to be addressed in the treatment plan.     Goals:  Short Term Goals: *** weeks   ***    Long Term Goals: ***  "weeks   ***    Plan     Certification Period/Plan of care expiration: 2/7/2023 to ***.    Outpatient Occupational Therapy {NUMBERS 1-5:93747} times weekly for {0-10:20507::"0"} weeks to include the following interventions: {[place] PLAN:66521}.    Corinne Rapier, OT      I certify the need for these services furnished under this plan of treatment and while under my care.  ____________________________________ Physician/Referring Practitioner   Date of Signature    "

## 2023-02-06 NOTE — PROGRESS NOTES
NEUROPSYCHOLOGICAL EVALUATION - CONFIDENTIAL    Referring Provider: Mervin Ramos MD  Medical Necessity: Evaluate cognitive and emotional functioning, participate in treatment planning/management, and provide supportive therapy in the setting of concussion   Date Conducted: 2023  Present At Visit: the patient and her partner, Eleuterio  [id number] = 55 minutes  Referral Diagnoses: S06.0X0A (ICD-10-CM) - Concussion without loss of consciousness, initial encounter     R41.3 (ICD-10-CM) - Memory deficit  Consent: The patient expressed an understanding of the purpose of the evaluation and consented to all procedures. She additionally provided consent to speak with her partner, Eleuterio, who was present during the clinical interview. We discussed the limits of confidentiality and discussed an emergency plan.    Telemedicine Details:   Established Patient - Audio Only Telehealth Visit   The patient location is: [place]  The chief complaint leading to consultation is: memory deficit  Visit type: Virtual visit with audio only (telephone)  Total time spent with patient: 55 minutes   The reason for the audio only service rather than synchronous audio and video virtual visit was related to technical difficulties or patient preference/necessity.   Each patient to whom I provide medical services by telemedicine is: (1) informed of the relationship between the physician and patient and the respective role of any other health care provider with respect to management of the patient; and (2) notified that they may decline to receive medical services by telemedicine and may withdraw from such care at any time. Patient verbally consented to receive this service via voice-only telephone call.     ASSESSMENT & PLAN:   Ms. Kassy Delgado is an 67 y.o., female with 12 years of education and pertinent medical history including acoustic neuroma s/p GSRS (14 Gy to the 50% isodose line) done on 21, hearing loss in the  "left ear, dizziness, numbness to the left side of her face mostly to her cheek and the tongue, and swallow difficulty who was referred for a neuropsychological evaluation in the setting of memory changes.       Full report to follow completion of testing.   Problem List Items Addressed This Visit    None  Visit Diagnoses       Memory deficit    -  Primary    Attention deficit disorder, unspecified hyperactivity presence              Thank you for allowing me to assist in Ms. Kassy Delgado's care. If you have any questions, please contact me at [phone].      Tiny Bull, PhD  Licensed Clinical Neuropsychologist  [facility]     CLINICAL INTERVIEW & RECORD REVIEW:     Cognitive Functioning   Cognitive screener: none  Previous evaluation(s): none  Onset & course of difficulty: ADD diagnosed when she was in her 50s. Never took a stimulant. Sustained a concussion 6 years ago that "knocked her eyesight out for 3 days." She and Eleuterio state that she improved, but did not return fully to baseline after this injury. Has noticed more thinking changes over the past two years that seem to have worsened over the past year.    Fluctuations: none  Severity of changes: some could be age-related   Examples:   Attention/Working Memory/Executive Functioning: ADD diagnosed when she was in her 50s. Never took a stimulant. Hyper focuses on interests and blocks out the world >> this is getting worse. Otherwise jumps from one topic to the next. Very easily bored and distracted. If she starts something, normally does a good job finishing it. Her greenhouse and kitchen are extremely well organized, Everything else is less organized. Harder time planning ahead and Eleuterio does 95% of planning.   Processing Speed: slower   Language: sometimes has word finding problems.   Visuospatial: hasn't driven in 3 years. Too dizzy to drive. Situational awareness and sense of what is " "around her is very bad. Has to be careful to not walk into things because she is not aware of what is there. Vision is okay but brain isn't putting it together that something is there. Tracking and reading is okay, just taking more concentration. Says she is glad she doesn't drive anymore because she doesn't feel like she can't read the signs anymore because the car is moving too fast   Learning & Memory: long term memory is good, but sometimes difficulty pulling up information. Cues usually jog her memory.   Exacerbating factors: none  Ameliorating factors: none  Medication for cognition: none     Daily Functioning   ADLs:    Bathing: Independent and without difficulty  Dressing: Independent and without difficulty  Grooming: Independent and without difficulty   Toileting: Independent and without difficulty  Transferring: Independent and without difficulty.  Eating: Independent and without difficulty.   IADLs:    Finances: Eleuterio manages the bills.  Medication Mgmt: Independent and without difficulty  Driving: doesn't drive due to dizziness   Household Mgmt: Independent and without difficulty Cooking/Meal Preparation: Eleuterio cooks. She collaborates on meals and does okay with it.   Shopping: Independent and without difficulty.  Appointment Mgmt: Independent and without difficulty     Psychiatric/Neuropsychiatric Symptoms   Mood: "fine"  Depression: no  Chantell/Hypomania: no  Anxiety: no  Stress: very low other than health concerns   Social Withdrawal: no  Neurovegetative Sxs:  Appetite: stable, but swallowing difficulty interferes.   Sleep: sleeps soundly. Not acting out dreams. Does have an occasional night when doesn't sleep at all. 5 to 7 hours. Sleep apnea has been diagnosed and has a CPAP machine and used it one night but it made the bronchitis worse so not using it.  Has nighttime oxygen and uses that one.   Energy: okay, doesn't have the same stamina as before   Hallucinations: no  Delusional/Paranoid Thinking: " no  Impulsivity: no  Obsessive/Compulsive Behaviors: no  Disinhibition: no  Irritability/Agitation: no  Aggression: no  Apathy/Indifference: no  Other changes in personality: no. Very caring person. Puts everyone else first.      Physical Functioning   Tremor: sometimes when holding her phone her left hand shakes. Difficulty brushing her teeth.   Difficulty walking: bad due to dizziness. Using a cane now. Has a wheelchair if a lot of walking is involved.   Imbalance: holding onto arm of Eleuterio  Falls: no  Weakness: yes  Trouble with fine motor movements: yes. Having more problems with both hands but having more trouble with both now. Can't sew like she used to. Pretty immobile right now.   Other: Almost whole left side of body feels like it's not related to the right side of her body.  Lightheadedness: dizziness  Urinary Urgency: has had a few accidents, both bladder and bowel. Leakage.   Sensory Sxs: taste and smell are good. Vision is good. Hearing reduced - almost completely gone in left ear.   Pain: not really  Physical Exercise Routine: can't due to dizziness   Other: Swallowing difficulty that is worsening. Started before the gamma knife but about a month after it started worsening. Difficulty swishing liquid around in her mouth.        RELEVANT HISTORY  This patient has a past medical history of Acoustic neuroma and Arthritis.    Past Surgical History:   Procedure Laterality Date    bilateral shoulder arthroplasty      HYSTERECTOMY      REWIRING OF STERNUM      right hip arthroplasty      right knee arthroplasty       Neurological History    Headaches/Migraines: yes - history of migraines. Hasn't had any in years.   TBI:   concussion 6 years ago with LOC for unknown duration. Knocked out her eyesight for 3 days.   3 concussions before that, but did well.   Seizures: two seizures in the last 6 to 8 years (about a year apart from one another) - took Keppra until she told the doctor she wasn't taking it anymore.  Abruptly stopped taking gabapentin. No seizures since that time.   Stroke: no  Tumor: acoustic neuroma s/p GSRS (14 Gy to the 50% isodose line) done on 7/14/21   Previous Episodes of Delirium: no  Movement Disorder: no  CNS Infection: no  Other: no       Neurodiagnostics     Results for orders placed or performed during the hospital encounter of 11/15/22   MRI Brain W WO Contrast    Narrative    EXAMINATION:  MRI BRAIN W WO CONTRAST    CLINICAL HISTORY:  acoustic neuroma; Benign neoplasm of cranial nerves    TECHNIQUE:  Multiplanar multisequence MR imaging of the brain was performed before and after the administration of 10 mL Gadavist intravenous contrast.    COMPARISON:  10/19/2021    FINDINGS:  Enhancing extra-axial lesion extending through the left internal auditory canal with rounded cystic component protruding into the CP angle cistern.  The cisternal component as decreased in size from prior examination, now measuring on the order of 1.4 x 1.2 cm (previously 1.7 x 1.5).  Mass effect on the lateral bhaskar slightly improved.  Some subtle T2 hyperintensity within the lateral margin of the bhaskar adjacent to the lesion.  No new enhancement..  The intracanalicular component is stable.  No new enhancement within the adjacent labyrinth in structures.    The right cranial nerve 7 8 complex unremarkable.    The brain appears stable.  Small remote left occipital infarct.  Small remote right cerebellar infarct.  No new major vascular distribution infarct.  No recent or remote hemorrhage.  New edema.  No abnormal parenchymal or leptomeningeal enhancement.    No new extra-axial blood or fluid collections.    The arterial T2 skull base flow voids are preserved.    Bone marrow signal intensity unremarkable.    Bilateral pseudophakia.      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  Subtle parenchymal T2 hyperintensity in the adjacent lateral bhaskar may reflect treatment effects, but there is no corresponding enhancement.    Remote left occipital and right cerebellar infarcts, stable.      Electronically signed by: Tal Elena MD  Date:    11/15/2022  Time:    13:14     Pertinent Lab Work   No results found for: [id number]  No results found for: RPR  No results found for: FOLATE  Lab Results   Component Value Date    TSH 1.50 2021     No results found for: LABA1C, HGBA1C  No results found for: HIV1X2, [id number]    Medications     Current Outpatient Medications   Medication Instructions    albuterol (PROVENTIL/VENTOLIN HFA) 90 mcg/actuation inhaler 2 puffs, Inhalation, Every 4 hours PRN    losartan (COZAAR) 100 mg, Oral     Psychiatric History   Prior Diagnoses: none  History of Trauma/Abuse: daughter committed suicide 17 years ago - this trauma has kept her distracted so they wonder if that is part of what she is going through.   History of Suicide Attempts: no  Current Ideation, Intention, or Plan: no  Homicidal Ideation: no   Medication(s): no  Hospitalization(s): no  Psychotherapy/Counseling: saw a psychologist for 5/6 years around time of daughter's suicide.   Other: no         Substance Use History     Social History     Tobacco Use    Smoking status: Never    Smokeless tobacco: Not on file   Substance and Sexual Activity    Alcohol use: Not on file    Drug use: Not on file    Sexual activity: Not on file     History of abuse/overuse: usually drinks a glass of wine with her meal at night. Quit smoking 10 or 11 years ago. No issues with alcohol or drugs.     Family Neurological & Psychiatric History     No family history on file.  Neurologic: Negative for heritable risk factors.   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 She has two children, late daughter and son who is 40 years old. 4 grandchildren.   Support System: good  Hobbies/Activities: likes to garden and travel, go out to eat, visit with friends  Current Living Situation: lives at home with Eleuterio      OBJECTIVE:     MENTAL STATUS AND OBSERVATIONS:   Appearance: Unable to assess   Alertness: Attentive and alert.   Orientation:   With the exception of being off by 1 for the date ("7th" when it was 6th), she was O x 4.    Gait:  Unable to assess   Psychomotor:  Unable to assess   Handedness:  Right   Vision & Hearing:  Adequate for session   Speech/language: Normal in rate, rhythm, tone, and volume. No significant word finding difficulty observed. Comprehension was normal.   Mood/Affect:  The patients stated mood was "fine." Affect was congruent with stated mood.    Interpersonal Behavior:  Rapport was quickly and easily established    Suicidality/Homicidality: Denied   Hallucinations/Delusions:  None evidenced or endorsed   Thought Content: Logical   Though Processes: Goal-directed   Insight & Judgment:  Appropriate   Participation in Interview:  Full + collateral     PROCEDURES/TESTS ADMINISTERED: Performed a review of pertinent medical records, reviewed limits to confidentiality, conducted a clinical interview, and explained procedures.                      This service was not originating from a related E/M service provided within the previous 7 days nor will  to an E/M service or procedure within the next 24 " hours or my soonest available appointment.  Prevailing standard of care was able to be met in this audio-only visit.

## 2023-02-07 ENCOUNTER — CLINICAL SUPPORT (OUTPATIENT)
Dept: REHABILITATION | Facility: HOSPITAL | Age: 68
End: 2023-02-07
Attending: PSYCHIATRY & NEUROLOGY
Payer: MEDICARE

## 2023-02-07 ENCOUNTER — OFFICE VISIT (OUTPATIENT)
Dept: NEUROLOGY | Facility: CLINIC | Age: 68
End: 2023-02-07
Payer: MEDICARE

## 2023-02-07 VITALS
DIASTOLIC BLOOD PRESSURE: 85 MMHG | WEIGHT: 220 LBS | HEIGHT: 59 IN | SYSTOLIC BLOOD PRESSURE: 129 MMHG | HEART RATE: 79 BPM | BODY MASS INDEX: 44.35 KG/M2

## 2023-02-07 DIAGNOSIS — Z86.018 HISTORY OF ACOUSTIC NEUROMA: ICD-10-CM

## 2023-02-07 DIAGNOSIS — E53.1 PYRIDOXINE DEFICIENCY: ICD-10-CM

## 2023-02-07 DIAGNOSIS — F07.81 POST-CONCUSSION SYNDROME: ICD-10-CM

## 2023-02-07 DIAGNOSIS — R56.9 SEIZURE: ICD-10-CM

## 2023-02-07 DIAGNOSIS — G60.3 IDIOPATHIC PROGRESSIVE NEUROPATHY: ICD-10-CM

## 2023-02-07 DIAGNOSIS — E08.40 DIABETES MELLITUS DUE TO UNDERLYING CONDITION WITH DIABETIC NEUROPATHY, WITHOUT LONG-TERM CURRENT USE OF INSULIN: ICD-10-CM

## 2023-02-07 DIAGNOSIS — E66.01 MORBID OBESITY: ICD-10-CM

## 2023-02-07 DIAGNOSIS — R41.3 MEMORY DEFICIT: ICD-10-CM

## 2023-02-07 DIAGNOSIS — E55.9 VITAMIN D DEFICIENCY, UNSPECIFIED: ICD-10-CM

## 2023-02-07 DIAGNOSIS — S06.0X0S CONCUSSION WITHOUT LOSS OF CONSCIOUSNESS, SEQUELA: Primary | ICD-10-CM

## 2023-02-07 DIAGNOSIS — G62.9 PERIPHERAL POLYNEUROPATHY: ICD-10-CM

## 2023-02-07 PROCEDURE — 1101F PT FALLS ASSESS-DOCD LE1/YR: CPT | Mod: CPTII,S$GLB,, | Performed by: PSYCHIATRY & NEUROLOGY

## 2023-02-07 PROCEDURE — 99205 PR OFFICE/OUTPT VISIT, NEW, LEVL V, 60-74 MIN: ICD-10-PCS | Mod: S$GLB,,, | Performed by: PSYCHIATRY & NEUROLOGY

## 2023-02-07 PROCEDURE — 1159F MED LIST DOCD IN RCRD: CPT | Mod: CPTII,S$GLB,, | Performed by: PSYCHIATRY & NEUROLOGY

## 2023-02-07 PROCEDURE — 4010F ACE/ARB THERAPY RXD/TAKEN: CPT | Mod: CPTII,S$GLB,, | Performed by: PSYCHIATRY & NEUROLOGY

## 2023-02-07 PROCEDURE — 4010F PR ACE/ARB THEARPY RXD/TAKEN: ICD-10-PCS | Mod: CPTII,S$GLB,, | Performed by: PSYCHIATRY & NEUROLOGY

## 2023-02-07 PROCEDURE — 99999 PR PBB SHADOW E&M-EST. PATIENT-LVL III: CPT | Mod: PBBFAC,,,

## 2023-02-07 PROCEDURE — 3288F FALL RISK ASSESSMENT DOCD: CPT | Mod: CPTII,S$GLB,, | Performed by: PSYCHIATRY & NEUROLOGY

## 2023-02-07 PROCEDURE — 1101F PR PT FALLS ASSESS DOC 0-1 FALLS W/OUT INJ PAST YR: ICD-10-PCS | Mod: CPTII,S$GLB,, | Performed by: PSYCHIATRY & NEUROLOGY

## 2023-02-07 PROCEDURE — 3074F SYST BP LT 130 MM HG: CPT | Mod: CPTII,S$GLB,, | Performed by: PSYCHIATRY & NEUROLOGY

## 2023-02-07 PROCEDURE — 3079F PR MOST RECENT DIASTOLIC BLOOD PRESSURE 80-89 MM HG: ICD-10-PCS | Mod: CPTII,S$GLB,, | Performed by: PSYCHIATRY & NEUROLOGY

## 2023-02-07 PROCEDURE — 99205 OFFICE O/P NEW HI 60 MIN: CPT | Mod: S$GLB,,, | Performed by: PSYCHIATRY & NEUROLOGY

## 2023-02-07 PROCEDURE — 3074F PR MOST RECENT SYSTOLIC BLOOD PRESSURE < 130 MM HG: ICD-10-PCS | Mod: CPTII,S$GLB,, | Performed by: PSYCHIATRY & NEUROLOGY

## 2023-02-07 PROCEDURE — 99999 PR PBB SHADOW E&M-EST. PATIENT-LVL III: ICD-10-PCS | Mod: PBBFAC,,,

## 2023-02-07 PROCEDURE — 97162 PT EVAL MOD COMPLEX 30 MIN: CPT

## 2023-02-07 PROCEDURE — 3079F DIAST BP 80-89 MM HG: CPT | Mod: CPTII,S$GLB,, | Performed by: PSYCHIATRY & NEUROLOGY

## 2023-02-07 PROCEDURE — 3008F PR BODY MASS INDEX (BMI) DOCUMENTED: ICD-10-PCS | Mod: CPTII,S$GLB,, | Performed by: PSYCHIATRY & NEUROLOGY

## 2023-02-07 PROCEDURE — 3008F BODY MASS INDEX DOCD: CPT | Mod: CPTII,S$GLB,, | Performed by: PSYCHIATRY & NEUROLOGY

## 2023-02-07 PROCEDURE — 1125F PR PAIN SEVERITY QUANTIFIED, PAIN PRESENT: ICD-10-PCS | Mod: CPTII,S$GLB,, | Performed by: PSYCHIATRY & NEUROLOGY

## 2023-02-07 PROCEDURE — 3288F PR FALLS RISK ASSESSMENT DOCUMENTED: ICD-10-PCS | Mod: CPTII,S$GLB,, | Performed by: PSYCHIATRY & NEUROLOGY

## 2023-02-07 PROCEDURE — 96125 COGNITIVE TEST BY HC PRO: CPT | Mod: 59

## 2023-02-07 PROCEDURE — 1159F PR MEDICATION LIST DOCUMENTED IN MEDICAL RECORD: ICD-10-PCS | Mod: CPTII,S$GLB,, | Performed by: PSYCHIATRY & NEUROLOGY

## 2023-02-07 PROCEDURE — 1125F AMNT PAIN NOTED PAIN PRSNT: CPT | Mod: CPTII,S$GLB,, | Performed by: PSYCHIATRY & NEUROLOGY

## 2023-02-07 NOTE — PROGRESS NOTES
[facility] Occupational Therapy  Initial Neurological Evaluation Post Concussion  CONCUSSION CLINIC     Date: 2/7/2023  Patient: Kassy Delgado  Chart Number: [id number]    Therapy Diagnosis:   Encounter Diagnoses   Name Primary?    Concussion without loss of consciousness, initial encounter     Memory deficit      Physician: Mervin Ramos MD    Physician Orders: OT eval and treat  Medical Diagnosis: F07.81 (ICD-10-CM) - Post-concussion syndrome   Evaluation Date: 2/7/2023  Plan of Care Expiration Period: ***  Insurance Authorization period Expiration: ***  Date of Return to MD: ***  Visit # / Visits Authorized: *** / ***  FOTO: ***/***    Time In:***  Time Out: ***  Total Billable (one on one) Time: *** minutes    Precautions: {IP WOUND PRECAUTIONS OHS:20453}    Subjective     History of Current Condition: Kassy Delgado is a 67 y.o. female who presents to [facility] for evaluation and treatment secondary to ***. Pt's concussion occurred in *** after ***. Pt reported initial symptoms included ***. Currently, pt is complaining of ***. Deficits***. PMHx: {+ OR -:30061} prior concussions; {+ OR -:05587} personal history of headaches/migraines; {+ OR -:18727} familial history of headaches/migraines; {+ OR -:04252} depression/anxiety; {+ OR -:42904} ADD/ADHD; {+ OR -:99427} learning disability; {+ OR -:46415} neck/shoulder/back pain prior to injury.     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 No new mass effect or subjacent parenchymal edema.     Previous Therapy: ***    Past Medical History/Physical Systems Review:     Past Medical History:  Kassy Delgado  has a past medical history of  Acoustic neuroma and Arthritis.    Past Surgical History:  Kassy Delgado  has a past surgical history that includes bilateral shoulder arthroplasty; right hip arthroplasty; right knee arthroplasty; Rewiring of sternum; and Hysterectomy.    Current Medications:  Kassy has a current medication list which includes the following prescription(s): albuterol and losartan.    Allergies:  Review of patient's allergies indicates:   Allergen Reactions    Dicloxacillin Shortness Of Breath    Apazone Other (See Comments)    Iodine Rash        Other: ***    Patient's Goals for Therapy: ***    Pain:  Pain Related Behaviors Observed: {YES/NO:20267} ***  Functional Pain Scale Rating 0-10:   Location: Headache  {NUMBERS 1-10:69022}/10 on average  {NUMBERS 1-10:23676}/10 at best  {NUMBERS 1-10:12359}/10 at worst  Description: {Pain Description:90476}  Aggravating Factors: {Causes; Pain:98769}  Easing Factors: {Pain (activities that relieve):81186}  Location: Neck/back/shoulder   {NUMBERS 1-10:35146}/10 on average  {NUMBERS 1-10:84712}/10 at best  {NUMBERS 1-10:24508}/10 at worst  Description: {Pain Description:22581}  Aggravating Factors: {Causes; Pain:50584}  Easing Factors: {Pain (activities that relieve):36257}    Occupation:  ***  Working presently: {Work history:49501}  Duties: ***    Functional Limitations/Social History:    Prior Level of Function: ***  Current Level of Function:***  ADLs/IADLs:  Feeding: {FIM List:48763} ***  Bathing: {FIM List:62818}  ***  Dressing/Grooming:  {FIM List:70511} ***  Cooking/Homecare: {FIM List:78969} ***  Computer/phone use: {FIM List:54828} ***  Reading: {NO, YES PAST, YES CURRENT:24079} ***  Functional Mobility:  Bed mobility: {AMB OT NEURO ASSISTANCE:91169}  Roll to left: {AMB OT NEURO ASSISTANCE:78092}  Roll to right: {AMB OT NEURO ASSISTANCE:53320}  Supine to sit: {AMB OT NEURO ASSISTANCE:39130}  Sit to supine: {AMB OT NEURO ASSISTANCE:62810}  Transfers to bed: {AMB OT NEURO  ASSISTANCE:03143}  Transfers to toilet: {AMB OT NEURO ASSISTANCE:04326}  Car transfers: {AMB OT NEURO ASSISTANCE:21841}  Wheelchair mobility: {AMB OT NEURO ASSISTANCE:31212}    Home/Living environment : {LIVES WITH:63541}  Home Access: ***  DME: {DME OWNED:32384}     Leisure: {AMB OT HAND LEISURE:66506}    Driving: ***    Adult Vision Questionnaire:   Directions: please check the answer that best describes your situation. If you wear glasses or contact lenses, answer the questions assuming that you are wearing them.   Never = never  Occasionally = less than 1 time/week  Frequently = at least 1 time/week  Always = everyday     Do you have headaches or facial pain? {adult vision questionnarie:86128}  Do you have pain in your eyes with eye movement? {adult vision questionnarie:95590}  Do you experience neck or shoulder discomfort? {adult vision questionnarie:71218}  Do you have dizziness, light headed or nausea while performing close up work? (computer work; reading; writing) {adult vision questionnarie:14154}  Do you have dizziness, light headed or nausea while performing far distance activities? (driving, tv, movies) {adult vision questionnarie:17159}  Do you experience dizziness when bending down and standing back up, or when getting up quickly? {adult vision questionnarie:13011}  Do you feel unsteady with walking, or drift to one side? {adult vision questionnarie:60160}  Do you feel overwhelmed or anxious while walking in a large department store or walking in a crowd? {adult vision questionnarie:99775}  Do you feel dizzy or off balance when walking down a long hallway or on bold patterned carpeting? {adult vision questionnarie:19631}  Does riding in a car make you feel dizzy or uncomfortable? {adult vision questionnarie:36885}  Do you find yourself with your head tilted to one side? {adult vision questionnarie:47167}  Does your posture tend to be leaning more forward or backward than your used to? {adult vision  "questionnarie:85160}  Do you experience poor depth perception or have difficulty estimating distances accurately? {adult vision questionnarie:49143}  Do you experience double/overlapping/shadowed vision at far distances? {adult vision questionnarie:86449}  Do you experience double/overlapping/shadowed vision at near distances? {adult vision questionnarie:25341}  Do you experience glare or have sensitivity to bright lights? {adult vision questionnarie:17638}  Do you close or cover one eye with near or far tasks? {adult vision questionnarie:55174}  Do you skip lines or lose your place while reading? {adult vision questionnarie:76013}  Do you use your finger to keep your place on the page? {adult vision questionnarie:80085}  Do you tire easily with close-up tasks? {adult vision questionnarie:96849}  Do you experience blurred vision with far distance tasks? (driving, television, movies, chalkboard at school) {adult vision questionnarie:21962}  Do you experience blurred vision with close up tasks? (computer, reading) {adult vision questionnarie:84402}  Do you experience words running together or appearing to move on the page? {adult vision questionnarie:22280}    History:  Have you ever been diagnosed with:  Traumatic brain injury (TBI) or concussion? {YES/NO:20267}  Reading disability? {YES/NO:20267}  Lazy eye? {YES/NO:20267}  Have you ever had an eye operation? {YES/NO:20267}    Dizziness Handicap Inventory: ***/100   16-34= mild   36-52= moderate   >/= 54= severe    Objective     Cognitive Exam  Oriented: {AMB OT NEURO COGNITIVE ORIENTED:94688}  Behaviors: {exam; behavior :67217::"normal","cooperative"}  Follows Commands/attention: {AMB OT NEURO COGNITIVE FOLLOWS COMMANDS:17610}  Communication: {AMB OT NEURO COGNITIVE COMMUNICATION:10901}  Memory: {AMB OT NEURO COGNITIVE MEMORY:22170}  Short Blessed (>10 cognitive impairment):  {Numbers; 1-28:23828} /28   Allan Cognitive assessment: (less than 26/30 abnormal) " "{NUMBERS - 1-30:50839}/30  ***  Safety awareness/insight to disability: {ONC PSO AWARENESS:85730}  Coping skills/emotional control: {AMB OT NEURO COGNITIVE COPING SKILLS:38455}  Comments: See speech therapy evaluation for formal cognitive assessments***    Physical Exam  Head Control/Neck Mobility: ***; see PT eval for formal assessments    Oculomotor Exam  Vestibular/Ocular-Motor Screening (VOMS) for Concussion  Visual/ Ocular Motor Test:  Not Tested Headache (0-10) Dizziness (0-10) Nausea (0-10) Fogginess (0-10) Comments    Baseline N/A *** *** *** ***    Smooth pursuits   (1.5 feet left/right of center; 2 times; 30 bpm each direction; horizontal and vertical)  *** *** *** ***    Saccades- horizontal (1.5 feet left/right of center; 3 feet away; 10 times; no specific speed)  *** *** *** ***    Saccades- vertical   (1.5 feet up/down of center; 3 feet away; 10 times; no specific speed)  *** *** *** ***    Convergence   (14 pt font; normal 5 cm)  *** *** *** *** Near point (cm):   1.  2.  3.   VOR- horizontal   (14 pt font; 20 degrees rotation left/right; 10 reps; 180 bpm)  *** *** *** *** ***   VOR- vertical   (14 pt font; 20 degrees rotation up/down; 10 times; 180 bpm)  *** *** *** *** ***   Visual motion sensitivity test   (80 degrees left/right; 5 times each direction; 50 bpm)  *** *** *** *** ***     Oculomotor ROM: ***  Eye Alignment: ***  Visual Field: ***  Acuity: {visual acuity OT:60852}    Spontaneous Nystagmus: ***  Gaze Holding Nystagmus: ***  Gaze Holding (No Fixation): NT  Smooth Pursuits: ***  Horizontal: ***   Vertical: ***  Saccades: ***   Horizontal: ***   Vertical: ***  Near Point Convergence (cm): ***   Accommodation (gaze shift between near target (16") and far target (10ft)): ***   Fixation (5 second sustained visual attention): ***    VOR Slow Head Movement: ***  Head Thrust: ***  Dynamic Visual Acuity (DVA) (using *** chart): *** line difference (***, ***)   Head Shake: ***  Cover/Cross Cover: " ***  Cover/Uncover: ***    Perceptual Testing:   Letter cancellation: ***/34 passing score where testing was completed in *** minute *** seconds, average time for completion is 1 minute 15 seconds  Line Bisection: WNLs ***  Maze Test: ***  seconds, *** error(s)  (Cut point score is 60 seconds or less with 1 error or less)   Trail Making Part A: *** seconds (Average 29 seconds, Deficient > 78 seconds)   Trail Making Part B: *** seconds (Average 75 seconds, Deficient > 273 seconds)  Motor Free Visual Perception Test (MVPT): assesses figure ground, visual discrimination, spatial relationships, visual closure and visual memory, ***/36, a *** score, where testing was competed in *** minutes *** seconds, average time for completion is 7 minutes  R/L discrimination: {Desc; intact/impaired:50366}   Motor Planning Praxis: {Desc; intact/impaired:83288}  Visual motor skills: (9 hole peg test)  RUE:***secs  LUE *** secs    Motion Sensitivity Testing  Movement Intensity (0-5) Duration  < 5 = 0  5 - 10s = 1  11 - 30s = 2  >30s = 3 Score  Intensity + duration score   1. Siting to Supine {drdizzinessratin}     2. Supine to left side {drdizzinessratin}     3. Supine to right side {drdizzinessratin}     4. Supine to sitting {drdizzinessratin}     5. L Ari Hallpike {drdizzinessratin}     6. Up from left {drdizzinessratin}     7. R Sulphur Hallpike {drdizzinessratin}     8. Up from right {drdizzinessratin}     9. Sitting, head tipped to L knee {drdizzinessratin}     10. Head up from left knee {drdizzinessratin}     11. Sitting, head tipped to R knee {drdizzinessratin}     12. Head up from right knee {drdizzinessratin}     13. Sitting head turns (5) {drdizzinessratin}     14. Sitting head pitches (5) {drdizzinessratin}     15. In stance, 180 degree turn to L {drdizzinessratin}     16. In stance, 180 degree turn to R  "{sohailinessratin}       MSQ = Total score x (# of positions with symptoms)/20.48    MSQ = ***    Interpretation:   Mild: 0 - 10  Moderate: 11 - 30  Severe: 31 - 100    Modified VAS (Vertebral Artery Screen), in sitting (rotation, then extension):  R: ***  L: ***    Positional Canal Testing:   Looking for nystagmus (slow phase followed by quick phase to the affected side for BPPV)    Ari Hallpike (posterior / CL anterior)   Right : {POSITIVE/NEGATIVE:04726} nystagmus, {POSITIVE/NEGATIVE:12044} dizziness   Left: {POSITIVE/NEGATIVE:45655} nystagmus, {POSITIVE/NEGATIVE:89691} dizziness  Horizontal Canals   Right: {POSITIVE/NEGATIVE:37890} nystagmus, {POSITIVE/NEGATIVE:63585} dizziness   Left: {POSITIVE/NEGATIVE:18729} nystagmus, {POSITIVE/NEGATIVE:89068} dizziness    Treatment Performed:    ***Epley maneuver performed to treat {LEFT/RIGHT:72767::" "} ***PSCC/HSSC/ASCC BPPV   Position 1: {cpposne} nystagmus lasting *** seconds; {cpposne} dizziness   Position 2: {cpposne} nystagmus lasting *** seconds; {cpposne} dizziness   Position 3: {cpposne} nystagmus lasting *** seconds; {cpposne} dizziness   Position 4: {cpposne} nystagmus lasting *** seconds; {cpposne} dizziness    CMS Impairment/Limitation/Restriction for FOTO *** Survey    Therapist reviewed FOTO scores for Kassy Delgado on 2023.   FOTO documents entered into EPIC - see Media section.    Limitation Score: ***%     Treatment     Treatment Time In: ***  Treatment Time Out: ***  Total Treatment time separate from Evaluation time:***    Kassy participated in dynamic functional therapeutic activities to improve functional performance for ***  minutes, including:  ***    Kassy participated in neuromuscular re-education activities to improve: {AMB PT PROGRESS NEURO RE-ED:40777} for *** minutes. The following activities were included:  ***    Kassy received therapeutic exercises to develop " "{AMB PT PROGRESS OBJECTIVE:38489} for *** minutes including:  ***    Kassy received the following manual therapy techniques: {AMB PT PROGRESS MANUAL THERAPY:35879} were applied to the: *** for *** minutes, including:  ***    Kassy received the following supervised modalities after being cleared for contradictions: {AMB PT SUPERVISED MODES:05650}  ***    Kassy received the following direct contact modalities after being cleared for contraindications: {AMB PT PROGRESS DIRECT CONTACT MODES:70150}  ***  Kassy participated in dynamic functional self care to improve ADL and IADL tasks for ***  minutes, including:  -***     Home Exercises and Patient Education Provided    Education provided:   - Role of OT, goals for OT, scheduling/cancellations, insurance limitations with patient.  - Additional Education provided: ***    Written Home Exercises Provided: {Blank single:75022::"yes","Patient instructed to cont prior HEP"}.  Exercises were reviewed and Kassy was able to demonstrate them prior to the end of the session.    Kassy demonstrated {Desc; good/fair/poor:24113} understanding of the education provided.     See EMR under {Blank single:34671::"Media","Patient Instructions"} for exercises provided {Blank single:04167::"2/7/2023","prior visit"}.    Assessment     Kassy Delgado is a 67 y.o. female referred to outpatient occupational therapy and presents with a medical diagnosis of ***, resulting in {AMB OT NEURO IMPAIRMENTS:08878} and demonstrates limitations as described in the chart below. Pt also reported ***. obj*** *** Pt's score on the DHI indicates a '*** handicap'. Following medical record review it is determined that patient will benefit from occupational therapy services in order to maximize oculomotor functioning and gaze stabilization, habituation for desensitization to environments/movements that elicit/increase symptoms, pt education on mindfulness/relaxation strategies, and HEP/HAP guidance to " improve functional participation with meaningful occupations.***    Patient prognosis is {REHAB PROGNOSIS OHS:82746} due to  ***  Patient will benefit from skilled outpatient Occupational Therapy to address the deficits stated above and in the chart below, provide patient/family education, and to maximize patient's level of independence.     Plan of care discussed with patient: {YES:40112}  Patient's spiritual, cultural and educational needs considered and patient is agreeable to the plan of care and goals as stated below:     Anticipated Barriers for therapy: ***    Medical Necessity is demonstrated by the following  Profile and History Assessment of Occupational Performance Level of Clinical Decision Making Complexity Score   Occupational Profile:   Kassy Delgado is a 67 y.o. female who {LIVES WITH:31483} and is currently {Work history:49029} as ***. Kassy Delgado has difficulty with  {ADLs:11524}  {IADLs:83009}  affecting his/her daily functional abilities. His/her main goal for therapy is ***.     Comorbidities:   {Co-morbidities:51325}    Medical and Therapy History Review:   {History Review:56695}               Performance Deficits    Physical:  {Physical:07477}    Cognitive:  {Cognitive:02263}    Psychosocial:    {Psychosocial:40470}     Clinical Decision Making:  {Desc; low/moderate/high:663807}    Assessment Process:  {Assessment Complexity:96059}    Modification/Need for Assistance:  {Modification:20470}    Intervention Selection:  {Treatment Options:91064}       {Desc; low/moderate/high:618687}  Based on PMHX, co morbidities , data from assessments and functional level of assistance required with task and clinical presentation directly impacting function.       The following goals were discussed with the patient and patient is in agreement with them as to be addressed in the treatment plan.     Goals:  Short Term Goals: *** weeks   ***    Long Term Goals: *** weeks   ***    Plan  "    Certification Period/Plan of care expiration: 2/7/2023 to ***.    Outpatient Occupational Therapy {NUMBERS 1-5:87712} times weekly for {0-10:20507::"0"} weeks to include the following interventions: {[place] PLAN:62098}.    Corinne Rapier, OT      I certify the need for these services furnished under this plan of treatment and while under my care.  ____________________________________ Physician/Referring Practitioner   Date of Signature    "
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
## 2023-02-07 NOTE — PROGRESS NOTES
"[facility]  Speech Therapy Evaluation - [facility]    Date: 2/7/2023     Name: Kassy Delagdo   MRN: [id number]    Therapy Diagnosis: No diagnosis found. Physician: Mervin Ramos MD  Physician Orders: Ambulatory Referral to Speech Therapy   Medical Diagnosis: Post-Concussion Syndrome    Visit # / Visits Authorized:  1 / 1   Date of Evaluation:  2/7/2023   Insurance Authorization Period: 11/15/2022 to 11/15/2023  Plan of Care Certification:    2/7/2023 to ***      Time In:***   Time Out: ***   Procedure Min.   Cognitive Communication Evaluation - including administration, scoring and interpretation   ***     Precautions: {IP WOUND PRECAUTIONS OHS:38276}  Subjective   Date of Onset: ***  History of Current Condition:  Kassy Delgado is a 67 y.o. female who presents to [facility] for evaluation and treatment secondary to post-concussion syndrome. Patient was referred to therapy by Dr. Ferguson [facility]. Patient's concussion occurred 6 years ago after ***. Pt reported initial symptoms included ***. Per Dr. Ramos, " She unfortunately sustained a concussion 6 years ago at which time the pt had loss of vision for approximately 1 week. She also reports a memory deficit and endorses difficulty with day to day actions. Additionally, pt endorses weakness to the bilateral hands (R>L). Of note, the pt notes intermittent tremors and uncontrollable motions. Described as though she is "falling all the time". She is feeling more anxious due to the reoccurring fear of falling."     Currently, pt is complaining of ***.  Patient {denied/endorsed:30750} changes in mood ***. Patient {denied/endorsed:83859} fatigue ***. Patient {DOES/NOT:57633} feel as though she has returned to baseline cognitive communication functioning.    Previous history of:  Previous concussions: {denied/endorsed:83430}  Anxiety: " {denied/endorsed:10530}  Depression: {denied/endorsed:22028}  Learning Disabilities: {denied/endorsed:70469}  ADHD: {denied/endorsed:29107}  Headaches and/or Migraines: {denied/endorsed:02370}    Past Medical History: Kassy Delgado  has a past medical history of Acoustic neuroma and Arthritis.  Kassy Delgado  has a past surgical history that includes bilateral shoulder arthroplasty; right hip arthroplasty; right knee arthroplasty; Rewiring of sternum; and Hysterectomy.  Medical Hx and Allergies: Kassy has a current medication list which includes the following prescription(s): albuterol and losartan.   Review of patient's allergies indicates:   Allergen Reactions    Dicloxacillin Shortness Of Breath    Apazone Other (See Comments)    Iodine Rash       Prior Therapy:  ***  Social History:   Lives with: ***  Family responsibilities: ***  Occupation: ***  Computer usage:  Driving: ***    Education Level: ***    Prior Level of Function: ***   Current Level of Function: ***    Pain:   Location: Headache  {NUMBERS 1-10:71522}/10 currently  {NUMBERS 1-10:02227}/10 on average  {NUMBERS 1-10:02214}/10 at best  {NUMBERS 1-10:38052}/10 at worst  Description: {Pain Description:33270}  Aggravating Factors: {Causes; Pain:51219}  Easing Factors: {Pain (activities that relieve):82351}    Nutrition:  No deficits, Oral, Thin liquids (IDDSI 0) and Regular consistencies (IDDSI 7); no reported swallowing difficulty ***  Patient's Therapy Goals:  ***  Objective   Formal Assessment:    The Cognitive Communication Checklist for Acquired Brain Injury (CCCABI): The CCCABI is a referral tool designed to help flag communication difficulties after brain injury. This questionnaire screens for dysfunction in six domains: Functional Daily Communication, Auditory Comprehension & Information Processing, Expression, Discourse & Social Communication, Reading Comprehension, Written Expression, and Executive Functions &  Self-Regulation. The results of the questionnaire are presented below.    Patient completed the questionnaire and {CCCABI:35592}    For the below categories only the patients self-identified complaints are listed for each domain.    Domain Patient Self-Identified Complaints   Functional Daily Communications Difficulties with:  {CC daily complaints:08067}   Auditory Comprehension & Information Processing Difficulties with:  {CC Auditory Comprehension:22675}   Expression, Discourse & Social Communication Difficulties with:  {CC expression and discourse:64364}   Reading Comprehension Difficulties with:  {CC reading Comprehension:41228}   Written Expression Difficulties with:  {CC Written Expression:42357}   Thinking, Reasoning, Problem Solving, Executive Functions, Self-Regulation Difficulties with:  {CC thinking and reasonin}   Reference: Elizabeth Schuler (2015) Cognitive Communication Checklist for Acquired Brain Injury (CCCABI) [employer] [facility]; Formerly [facility], H 6J2 , [email or web address]      The Repeatable Battery for the Assessment of Neuropsychological Status (RBANS) Version {BLOCK LETTERS (A,B,C):99726} was administered to measure the patient's attention, language, visuospatial/constructional abilities, and immediate and delayed memory. The results are outlined below:    Domain Subtest Total Score Index Score   Immediate Memory List Learning ***   ***    Story Memory ***    Visuospatial/  Constructional Figure Copy ***   ***    Line Orientation ***    Language Picture Naming ***   ***    Semantic Fluency ***    Attention Digit Span ***   ***    Coding ***      Delayed Memory List Recall ***     ***    List Recognition ***     Story Recall ***     Figure Recall ***        Total Scale   ***     Percentile   ***     Descriptor   ***   Interpretation:  Scaled score: mean of 10, standard deviation of 3. Therefore, 16+ = Very Superior; 14-15 = Superior; 12-13 = High Average; 8-11 = Average; 6-7 =  Low Average; 4-5 = Borderline; 3 and below = Extremely Low    Index score: mean of 100, standard deviation of 15. Therefore, 130+ = Very Superior; 120-129 = Superior; 110-119 = High average;  = Average; 80-89 = Low Average; 70-79 = Borderline; 69 and below = Extremely Low. Apparently the most reliable score; factor in education level.    Immediate Memory Score: Recalling information following immediate presentation is assessed through the List Learning and Story Memory subtests. In the List Learning subtest, the patient is given 10 words to remember. This list is presented four times overall. In this subtest, the patient {Desc; did/not:40194} demonstrate learning over the 4 trials. The patient recalled *** items on trial one, *** items on trial two, *** items on trial three, and *** items in trial 4. In the Story Memory subtest, the patient recalled *** details on the first presentation and *** details on the second presentation. Results of this domain indicate ***.  Visuospatial score: Perceiving spatial relations and constructing a spatially accurate copy of a drawing is assessed through the Figure Copy and Line Orientation subtests. The Figure Copy subtest asks the patient to copy a complex line drawing. The patient ***. The Line Orientation subtest the presents the patient with 12 line displays and asks the patient to match two given lines at the bottom to the display at the top.  The patient correctly identified ***/20. Results of this domain indicate ***.  Language score: Naming common items and retrieving learned material is assessed through the Picture Naming and Semantic Fluency subtests. The Picture Naming subtest asks the patient to name 10 line drawings. The patient was able to accurately name ***/10 items. The Semantic Fluency subtest asks the patient to name as many animals as she can in 60 seconds. The patient was able to name *** animals. These results indicate ***.   Attention score: Attending  to, holding and manipulating information presented visually and orally in working memory is assessed with use of the Digit Span and Coding subtests. The Digit Span subtest asks the patient to repeat progressively lengthening strings of numbers. The Coding subtest asks the patient to alternate attention between a key the given work and then to decode symbols to numbers. The patients results on these subtest indicate ***.  Delayed Memory score: Anterograde memory capacity is assessed through the List Recall, List Recognition, Story Recall, and Figure Recall subtests. The List Recall subtest asks the patient to recall items from the list presented at the beginning of the test. The patient was able to recall ***/10 items. The List Recognition subtest has the patient recall whether a word was or was not in the original list. The patient accurately identified whether a word was or was not on the list in *** of 20 items. On the Story Recall subtest, the patient was able to recall *** details. Finally, on the Figure Recall, the patient recalled *** details. Results of this domain indicate ***.    Overall, according to the RBANS research, total Scale index is a good indicator of general cognitive functioning. The patient presents with a *** cognitive communication disorder charaterized by deficits in ***. ***.      The Functional Assessment of Verbal Reasoning and Executive Strategies (FAVRES) was administered to assess subtle cognitive-communication difficulties including complex communication, verbal reasoning, and executive functioning.   The FAVRES is a standardized assessment for adults with acquired brain injury that assesses verbal reasoning, complex comprehension, discourse, and executive functioning during performance on a set of functional tasks. It has a mean of 100 and a standard deviation (SD) of 15. Each of the four FAVRES tasks includes at least five main factors that must be considered in order to draw a  conclusion, requires some degree of inferential thinking, requires discrimination of relevant and irrelevant information, and includes competing answers or foils to prompt the examinee to weigh factors when making a decision. Due to time constraints of the assessment, the following tasks were administered: {FAVRES SUBTESTS:79637}    The FAVRES 5-point scoring system was designed such that a score of 4 or 5 would reflect consideration of the majority of factors in the reasoning problem. Therefore scores of less than 4 reflect flawed performance. Kassy's results are below.     Task 1 - Planning an Event Task 2 - Scheduling  Task 3 - Making a Decision  Task 4 - Building a Case    Accuracy Raw: ***  Percentile: *** %  Standard Score: *** Raw: ***  Percentile: *** %  Standard Score: *** Raw: ***  Percentile: *** %  Standard Score: *** Raw: ***  Percentile: *** %  Standard Score: ***   Rationale  Raw: ***  Percentile: ***%  Standard Score: *** Raw: ***  Percentile: ***%  Standard Score: *** Raw: ***  Percentile: *** %  Standard Score: *** Raw: ***  Percentile: *** %  Standard Score: ***   Time  Raw: ***  Percentile: *** %  Standard Score: *** Raw: ***  Percentile: *** %  Standard Score: *** Raw: ***  Percentile: *** %  Standard Score: *** Raw: ***  Percentile: *** %  Standard Score: ***     Reasoning Subskills    Planning an Event Scheduling Making a Decision Building a Case   Getting the Facts  *** / 5  *** / 5  *** / 5  *** / 5   Eliminating Irrelevant  *** / 1 *** / 1 *** / 1 *** / 1   Weighing Facts  *** / 1  *** / 1   *** / 1   *** / 2    Flexibility  *** /1  *** / 1 *** / 1 *** / 1   Generating  *** / unlimited  ***/ unlimited ***/ unlimited ***/ unlimited   Predicting Consequences  *** / 4  *** / 4 *** / 4  *** / 2   Total Reasoning Subskills  ***  ***  ***  ***       Task 1: Planning an Event  The Planning an Event task requires that the examinee analyze newspaper listings to choose an appropriate event within the  constraints of time, money, and appropriateness to the participants. On this task, Kassy did/did not choose an appropriate event. She {WAS WAS NOT:25979} able to provide three reasons why/was able to provide *** valid valid reasons for the choice made she chose this particular event. This placed her performance in the *** percentile for accuracy, with a standard score of ***, indicating her performance was in the {AVERAGE:50463} range. AVE also scored in the *** percentile for rationale for this task, with a standard score of ***, placing her performance in the {AVERAGE:18037} range. Additionally, the time needed to complete the task was measured. AVE required *** minutes and *** seconds to complete the task, placing her performance is in the *** percentile with a standard score of ***, indicating her performance is in the {AVERAGE:00876} range. Her timing score indicates she {does/does not:78711} require additional time to facilitate accuracy when making decisions. In this task, AVE  presented with strengths in ***. Difficulties were noted in ***. Observations noted during the task included: ***.    The reasoning portion of this subtest requires the patient to consider the most important facts in planning the event, filter irrelevant information, consider relevant choices, generate additional appropriate activities, and make predictions about positive and negative outcomes. Additionally, the patient was asked to generate several activities that would be appropriate for the parameters provided. In one minute, she was able to generate *** relevant choices. These responses indicated {Desc; adequate/inadequate:56257} generative and divergent productions.     Task 2: Scheduling  The Scheduling task requires the examinee to analyze a things to do list and telephone messages to organize daily activities according to priorities and time constraints. On this task, AVE received a standard score of ***  for her accuracy, placing her  performance in the *** percentile. These scores are indicative of AVE 's ability to schedule events at the appropriate time. For rationale scoring, she received a standard score of ***, placing her in the *** percentile which falls in the {AVERAGE:11419} range. AVE 's timed score received a standard score of ***, placing her in the *** percentile, which falls within the {AVERAGE:28696} range. In this task, AVE presented with strengths in ***. Difficulties were noted in ***. Observations noted during the task included: ***.    The reasoning portion of this subtest requires the patient to consider the most important facts in planning the event, filter irrelevant information, consider relevant choices, generate additional appropriate activities, and make predictions about positive and negative outcomes. Additionally, the patient was asked to generate several activities that would be appropriate for the parameters provided. In one minute, she was able to generate *** relevant choices. These responses indicated {Desc; adequate/inadequate:15145} generative and divergent productions.     Task 3: Making a Decision  The Making a Decision task requires that the examinee analyze the transcript of a conversation to choose an appropriate gift within the constraints of the recipients' preferences and physical restrictions. On this task, AVE {did/did not:97361} choose an appropriate gift and {WAS WAS NOT:97716} able to provide adequate rationale for which gifts were and were not chosen. This placed her performance in the *** percentile for accuracy, with a standard score of ***, indicating her performance was in the {AVERAGE:57546} range. She chose {favres choices:30495} option for the gift. AVE also scored in the *** percentile for rationale for this task, with a standard score of ***, placing her performance in the {AVERAGE:87649} range. These scores reflect her ability to  recall important information regarding how she made her decision and the thought process behind her choice. Additionally, the time needed to complete the task was measured. AVE required *** minutes and *** seconds to complete the task, placing her performance is in the *** percentile with a standard score of *** indicating her performance is in the {AVERAGE:31561} range. In this task, AVE presented with strengths in ***. Difficulties were noted in ***. Observations noted during the task included: ***.    The reasoning portion of this subtest requires the patient to consider the most important facts in planning the event, filter irrelevant information, consider relevant choices, generate additional appropriate activities, and make predictions about positive and negative outcomes. Additionally, the patient was asked to generate several activities that would be appropriate for the parameters provided. In one minute, she was able to generate *** relevant choices. These responses indicated {Desc; adequate/inadequate:45977} generative and divergent productions.      Task 4: Building a Case  The Building a Case task requires that the examinee analyze a story to generate an appropriate complaint to a company regarding their service. On this task, AVE chose was able to articulate her complaint; ***however, she was only able to generate ***  solution within her written response. This placed her performance in the *** percentile for accuracy (complaints), with a standard score of ***, indicating her performance was in the {AVERAGE:95478} range. AVE also scored in the *** percentile for rationale for this task, with a standard score of ***, placing her performance in the {AVERAGE:69021} range. These scores reflect AVE's ability to generate a case regarding given information. Additionally, the time needed to complete the task was measured. She required *** minutes and *** seconds to complete the task,  "placing her performance is in the *** percentile with a standard score of *** indicating her performance is in the {AVERAGE:28173} range. In this task, [unfilled] presented with strengths in ***. Difficulties were noted in ***. Observations noted during the task included: ***.    The reasoning portion of this subtest requires the patient to consider the most important facts in planning the event, filter irrelevant information, consider relevant choices, generate additional appropriate activities, and make predictions about positive and negative outcomes. Additionally, the patient was asked to generate several activities that would be appropriate for the parameters provided. In one minute, she was able to generate *** relevant choices. These responses indicated {Desc; adequate/inadequate:36954} generative and divergent productions.     Overall, Patient demonstrates impairments in executive functioning as characterized by ***.    Treatment   Total Treatment Time Separate from Evaluation: {Blank single:42801::"*** minutes","n/a"}   {Blank single:32670::"***","no treatment performed 2/2 time to complete evaluation."}    Education: {OP ST Education:27718} {Actions; was/were:394130} discussed with pt. Patient {and and/or or:56224} family members expressed understanding. ***    Home Program: ***  Assessment     Kassy presents to [facility] status post medical diagnosis of {CC diagnosis:52425}.  Demonstrates impairments including limitations as described in the problem list. She presents with *** characterized by ***. ***  Positive prognostic factors include ***. Negative prognostic factors include ***. {barriers to therapy:26093} Patient will benefit from skilled, outpatient neurological rehabilitation speech therapy.    Rehab Potential: {DESC; POOR/FAIR/GOOD/EXCELLENT:87950}  Pt's spiritual, cultural, and educational needs considered and patient agreeable to plan of care and " goals.    Short Term Goals (*** weeks):   ***Patient will complete selective attention tasks (auditory or visual) with 90% accuracy independently increase selective attention.  ***Patient will sustain attention to complete moderate to complex reasoning tasks for 2 minutes with one request for clarification to increase sustained attention.  ***Patient will use attention shifting strategies to shift attention between two tasks with no more than 3 cues or 90% accuracy to improve alternating attention.   ***Patient will use Goal Plan Action Review strategy to complete moderate to complex reasoning, planning, or organization tasks with 90% accuracy independently to improve functional executive function skills.  ***Patient will complete short term recall tasks after a 5 minutes delay with 90% accuracy  independently  with use of memory strategies to improve recall of information and generalization of memory strategies.  ***Patient will complete high level problem solving based tasks with 90% accuracy independently.  ***Patient will complete mental manipulation tasks with 90% acc to improve working memory.  ***Patient will demonstrate awareness of cognitive-communication difficulties in 1-2 situations in her day in which cognitive deficits could or did compromise efficiency and performance with minimal cueing.   ***Patient will independently implement cognitive/sensory rest periods throughout day after identifying cognitive fatigue including limiting sensory input (sound, light, etc.)   ***Patient will identify two solutions to functional daily problems with thought flexibility and minimal assistance for use of strategies.     ***Patient will independently generate strategies to improve ability to complete tasks with enhanced accuracy and time, based on review of objective previous performance on trials of functional tasks with 80% accuracy.   ***Patient will complete a task to improve attention and memory (I.e. sample  bill paying activity, recipe, or multiple choice comprehension questions to 1 paragraphs) with 80% accuracy and natural environment noise distractions (TV news background, music, etc.).    Long Term Goals (*** weeks):   ***Patient will improve  attention skills to effectively attend to and communicate in {simple/complex:145344} daily living tasks in functional living environment.   ***Patient will demonstrate use of {executive function:23507} during daily living activities to improve safety and awareness in functional living environment.  ***Patient will apply problem-solving strategies {With/no:72814} visual support in daily living functional activities at home and in the community.   *** Patient will predict objective performance on completion of actual tasks to increase independence with required return to daily living environment.   *** Patient will use appropriate memory strategies to schedule and recall weekly activities, express needs and recall names to maintain safety and participate socially in functional living environment.      Plan     Plan of Care Certification Period: 2/7/2023 to ***    Recommended Treatment Plan:  Patient will participate in the [facility] rehabilitation program for speech therapy {NUMBER 1-5:11835} times per week to address her {SLP TREATMENT AREAS:7996097086} deficits, to educate patient and their family, and to participate in a home exercise program.    Follow up will occur at [facility] {[facility]:31544} for skilled Speech Therapy services.    Other Recommendations: ***    Therapist's Name:   Roselia Leigh CCC-SLP   2/7/2023     I CERTIFY THE NEED FOR THESE SERVICES FURNISHED UNDER THIS PLAN OF TREATMENT AND WHILE UNDER MY CARE    Physician Name: _______________________________    Physician Signature: ____________________________
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
## 2023-02-07 NOTE — PROGRESS NOTES
Subjective:       Patient ID: Kassy Delgado is a 67 y.o. female.    Chief Complaint:  Concussion      Consultation Requested by:   Mervin Ramos Md  [address]  [address]  [place] [address]    History of Present Illness  67 year old female presents for evaluation of head injury.  Looking back at her medical record and discussing with the patient the patient had an episode of PRES back at the end of 2016 or at the start of 2017.  She is accompanied by a loved one who has been with her for the last 4 years but since his injury was before then she does not have an accurate  on the time line.  She did see a neurologist twice in [place] who had MRIs that show resolving posterior reversible encephalopathy syndrome.  EEGs at the time showed no seizure.  The patient notes that she was placed on Keppra initially but then taken off of it and has not had any sort of seizure-like episodes since.  We have discussed the typical etiology of posterior reversible encephalopathy syndrome the patient notes that she has never been on immunosuppressants and at that time was not hypertensive.  Complicating matters still was she did hit her head at that time but she also complains of other issues that have evolved over time.  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  I have reviewed the patient's imaging interpreted the images for the patient and her partner.  We have discussed that due to the maximum size that the acoustic schwannoma group, it did impinge on her left cranial nerve 8.  As a result the patient has lingering left-sided sensorineural hearing loss and she complains of dizziness.  She notes the dizziness his main complaint and this has been occurring since at least 2017 and then she had gamma knife ablation in July of 2021.  Going back to her visit after her hospitalization for posterior reversible encephalopathy syndrome in 2017, there  was mention of a small vestibular schwannoma in the outside neurologist note however there is not a mention of the size of the schwannoma on that documentation, unfortunately.  The patient's 2nd complaint and that being cognitive issues, knowing that she feels unsafe to drive because she does not think she has a reaction time and would not make the correct decisions to drive safely.  She notes a 3rd complaint is her depth perception for at least 4 years.  She did have cataract surgery in July of 2022.  On reviewing the patient's MRI results from 2018, there is an area of gliosis in the left occipital lobe and I have explained at length to the patient and her partner how this could impair visual acuity despite having had a successful cataract surgery.  She notes a 4th complaint of swallowing issues, chewing issues and breathing issues.  The patient does have a history of COPD and is being treated by a pulmonologist for that.  We have reviewed her imaging that most recently occurred in November of 2022 and I have interpreted the images for the patient and her partner, showing that she is had a reduction of size of her acoustic schwannoma due to successful gamma knife treatment.  I have also looked back at her largest images of her schwannoma and there is shift of the brainstem towards the right.  I have explained that compression and shift of the brainstem could cause problems with the motor tracts that descend from the brain but also given its location, could cause problems with some swallowing.  She notes that she does also complain of numbness from her head to her toes usually on the left side of her body.  She did have a cervical spine fusion back in the year 2000 and has updated cervical spine imaging which we have reviewed and interpreted for the patient today explaining that she has multilevel degenerative changes but nothing that looks like it would require urgent surgical intervention.  There is no myelopathy  compressing the cervical spinal cord.  She notes that this numbness has gotten worse in the last year and she feels as if her left hand is more numb than her right hand.  I have again explained that the location of her acoustic schwannoma and the size of it was washed enough to compressing shift the brainstem off of midline, which would explain some of these other issues.    She has filled out a postconcussion symptom questionnaire and scored the following:  Four, severe problem for dizziness, noise sensitivity, poor concentration, taking longer to think, light sensitivity   Three, moderate problem for sleep disturbance, feeling frustrated, forgetfulness, restlessness   Two, mild problem for blurred vision   One, no more problem for being irritable, feeling depressed   0, not experience at all for headaches, nausea, sleep disturbance, double vision    Past Medical History:   Diagnosis Date    Acoustic neuroma     Arthritis        Past Surgical History:   Procedure Laterality Date    bilateral shoulder arthroplasty      HYSTERECTOMY      REWIRING OF STERNUM      right hip arthroplasty      right knee arthroplasty         No family history on file.    Social History     Socioeconomic History    Marital status:    Tobacco Use    Smoking status: Never       Review of Systems  Review of Systems   Constitutional:  Positive for activity change and fatigue.   Eyes:  Positive for visual disturbance.   Gastrointestinal:  Negative for nausea.   Musculoskeletal:  Positive for back pain and neck stiffness.   Neurological:  Positive for dizziness and numbness. Negative for seizures.   Psychiatric/Behavioral:  Positive for sleep disturbance.    All other systems reviewed and are negative.    Objective:     Vitals:    02/07/23 1037   BP: 129/85   Pulse: 79      Physical Exam  Vitals reviewed.   HENT:      Head: Normocephalic.   Eyes:      Extraocular Movements: EOM normal.      Pupils: Pupils are equal, round, and reactive  to light.   Pulmonary:      Effort: Pulmonary effort is normal.   Neurological:      Mental Status: She is alert and oriented to person, place, and time.      Deep Tendon Reflexes:      Reflex Scores:       Tricep reflexes are 2+ on the right side and 1+ on the left side.       Bicep reflexes are 2+ on the right side and 1+ on the left side.       Brachioradialis reflexes are 2+ on the right side and 1+ on the left side.       Patellar reflexes are 2+ on the right side and 1+ on the left side.  Psychiatric:         Speech: Speech normal.         NEUROLOGICAL EXAMINATION:     MENTAL STATUS   Oriented to person, place, and time.   Registration: recalls 2 of 3 objects. Recall at 5 minutes: recalls 1 of 3 objects.   Attention: decreased. Concentration: decreased.   Speech: speech is normal   Level of consciousness: alert    CRANIAL NERVES     CN II   Visual acuity: (decreased visual acuity in right bayron field, bilateral)    CN III, IV, VI   Pupils are equal, round, and reactive to light.  Extraocular motions are normal.     CN V   Facial sensation intact.     CN VII   Facial expression full, symmetric.     CN VIII   Hearing: impaired (SNHL in left ear)    CN IX, X   CN IX normal.   CN X normal.     CN XI   CN XI normal.     CN XII   CN XII normal.     MOTOR EXAM   Muscle bulk: normal  Overall muscle tone: normal  Left arm tone: increased  Left leg tone: increased    Strength   Strength 5/5 except as noted.   Left iliopsoas: 4/5    REFLEXES     Reflexes   Right brachioradialis: 2+  Left brachioradialis: 1+  Right biceps: 2+  Left biceps: 1+  Right triceps: 2+  Left triceps: 1+  Right patellar: 2+  Left patellar: 1+  Assessment/Plan:     Problem List Items Addressed This Visit          Neuro    Seizure       Endocrine    Morbid obesity     Other Visit Diagnoses       Concussion without loss of consciousness, sequela    -  Primary    Memory deficit        Relevant Orders    Ambulatory referral/consult to Neuropsychology     History of acoustic neuroma        Relevant Orders    Ambulatory referral/consult to Neuropsychology    Peripheral polyneuropathy        Relevant Orders    Hemoglobin A1C    TSH    T3    Vitamin D    Vitamin B6    Vitamin B2    Vitamin B1    Vitamin B12 Deficiency Panel    Heavy Metals Screen, Blood (Quantitative)    Diabetes mellitus due to underlying condition with diabetic neuropathy, unspecified        Relevant Orders    Hemoglobin A1C    TSH    Vitamin D deficiency, unspecified        Relevant Orders    Vitamin D    Pyridoxine deficiency        Relevant Orders    Vitamin B6    Idiopathic progressive neuropathy        Relevant Orders    Vitamin B12 Deficiency Panel    Ambulatory referral/consult to Neuropsychology          68yo female presents for a complicated constellation of symptoms after multiple neurologic insults.  I have explained to the patient at length today after having discussed with my concussion rehabilitation team, that I do not believe that she is directly dealing with anything related to concussion.  I have explained that the injury that she had 6 years ago from documentation is posterior reversible encephalopathy syndrome with lingering left PCA territory gliosis.  We have also discussed that at the apex of its size, her left acoustic schwannoma likely caused compressive damage to her left cranial nerve 8 causing lingering deficits with regards to dizziness and hearing.  We have also discussed that since there was midline shift and compression of part of the brainstem, some of the swallowing and some of the left hemibody sensory abnormalities that she complains of are also likely related to that damage.  I have discussed with the patient that she has had a life-saving treatment that reduce the size of her left-sided acoustic schwannoma after likely compression of cranial nerve 8 on the left and midline shift of the brainstem.  We have discussed that because these events occurred she now has a  new normal with likely lifelong dizziness, bayron sensory disturbance and due to the gliosis on the left occiput, visual acuity problems for the rest of her life.  There are some concerning issues with regards to her memory, and we have discussed that she has formal neuropsychological testing coming up in the next week or so.  I will see her back in 3 months after checking his formal neuropsychological testing.  I have told the patient and her partner that in my differential there may be a possibility of a neurodegenerative issue, however further neuropsychological testing should help us differentiate with this.  I will send reversible causes of neuropathy and cognitive impairment as outlined above with the vitamins and heavy metal screen and evaluation for thyroid abnormalities and diabetes.  I will see the patient back in about 3 months.  I have advised the patient to try to go back to her ENT to see if he agrees with her dizziness being part of a new normal condition from damage to the 8th cranial nerve.      The patient verbalizes understanding and agreement with the treatment plan. Questions were sought and answered to her stated verbal satisfaction.        Luz Ferguson MD    This note is dictated on M*Modal Fluency speech recognition program. There are word recognition mistakes that are occasionally missed on review.  Based on our encounter today, my overall Medical Decision Making is a Level 5 because of High = 1 or more chronic illnesses with severe exacerbation, progression, or side effects of treatment; 1 acute or chronic illness or injury that poses a threat to life or bodily function and Extensive = Review of prior external note(s) from each unique source; Review of the result(s) of each unique test; Ordering of each unique test; and Assessment requiring an independent historian(s) AND Independent interpretation of a test performed by another physician/other qualified health care professional (not  separately reported) AND/OR Discussion of management or test interpretation with external physician/other qualified health care professional\appropriate source (not separately reported)  based on Number of Problems or Complexity of Problems and Amount and/or Complexity of Data to be Reviewed and Analyzed

## 2023-02-07 NOTE — PATIENT INSTRUCTIONS
STRATEGIES FOR COGNITIVE FUNCTIONS  Attention:   Reduce distractions in the area as much as possible.  Look at the person you are talking to   Write down important pieces of information which you may need to reference later  Ask them to repeat if you find yourself not paying attention  Have visual cues to remind you if you need to do something later.  Attention in Conversation  Listen Actively  Eliminate distractions  Ask Questions   Paraphrase  Processing Speed:   Using multiple ways of learning new information- e.g., listening, writing notes, asking questions, recording  Allowing sufficient time to complete tasks will reduce frustration and help to ensure completion.  Executive Functioning:  Dont attempt to multi-task.  Separate tasks so that each of the tasks has separate, designated times  Consider using a calendar/day planner, as that may be effective to help you plan and stay on track.  Color-coding specific tasks by importance may add additional benefit to your planner.  Break down large projects into smaller tasks and write down the steps to completing the task.    Taking notes while reading can help with recall.  Make the environment the best you can (e.g., turning off the TV, reducing background noise, lighting)  Memory  Rehearse - Immediately after seeing/hearing something, try to recall it.  Wait a few minutes, then check again.  Gradually lengthen the intervals between rehearsals.  Repetition -- reciting the information you heard silently or aloud  Write down important information to improve your attention and focus and to have something to look back on when you need to recall it.  Asking for Clarification - asking the person to repeat or rephrase what they said  Visualization - picturing information in your head to help the brain better organize the information  The weirder, funnier, and more elaborate, the better  Use a cue - Symbolic reminders (the proverbial string around the finger) are helpful.   "So too are memos, timers, calendar notes, etc.--keep them in visible, appropriate places.  Get organized - Have fixed locations for all important papers, key phone numbers, medications, keys, wallet, glasses, tools, etc.  Develop routines - Routines can anchor memories so they do not drift away.    Use a Calendar system - Benefits include:  All information is in one place  You rely less on your memory alone  Will help with establishing routines  Will have a record of what you need to to, but also what you have completed  Word Finding:  Try to think of the first letter  Describe it  Think of related words  State the category or topic  Use a similar word    TIPS FOR CONSERVING ENERGY  Sleep Enough  Take Breaks  Exercise  Pace yourself    Be open to help  Avoid interruptions  Cut distractions  Keep it simple     References:JOSLYN Dias, HITESH Richey, DREAD Driver, JEFFREY Monroe, &amp; HARISH Fong (2009, July). Cognitive Symptom Management and Rehabilitation Therapy (CogSMART) for Traumatic Brain Injury.     Twelve Simple Tips to Improve Your Sleep  [email or web address]    Falling asleep may seem like an impossible dream when youre awake at 3 a.m., but good sleep is more under your control than you might think. Following healthy sleep habits can make the difference between restlessness and restful slumber. Researchers have identified a variety of practices and habits--known as sleep hygiene"--that can help anyone maximize the hours they spend sleeping, even those whose sleep is affected by insomnia, jet lag, or shift work.    Sleep hygiene may sound unimaginative, but it just may be the best way to get the sleep you need in this 24/7 age. Here are some simple tips for making the sleep of your dreams a nightly reality:    #1 Avoid Caffeine, Alcohol, Nicotine, and Other Chemicals that Interfere with Sleep  Caffeinated products decrease a persons quality of sleep. As any coffee lover " "knows, caffeine is a stimulant that can keep you awake. So avoid caffeine (found in coffee, tea, chocolate, cola, and some pain relievers) for four to six hours before bedtime. Similarly, smokers should refrain from using tobacco products too close to bedtime.    Although alcohol may help bring on sleep, after a few hours it acts as a stimulant, increasing the number of awakenings and generally decreasing the quality of sleep later in the night. It is therefore best to limit alcohol consumption to one to two drinks per day, or less, and to avoid drinking within three hours of bedtime.    #2 Turn Your Bedroom into a Sleep-Inducing Environment  A quiet, dark, and cool environment can help promote sound slumber. Why do you think bats congregate in caves for their daytime sleep? To achieve such an environment, lower the volume of outside noise with earplugs or a "white noise" appliance. Use heavy curtains, blackout shades, or an eye mask to block light, a powerful cue that tells the brain that it's time to wake up. Keep the temperature comfortably cool--between 60 and 75°F--and the room well ventilated. And make sure your bedroom is equipped with a comfortable mattress and pillows. (Remember that most mattresses wear out after ten years.)    Also, if a pet regularly wakes you during the night, you may want to consider keeping it out of your bedroom.     It may help to limit your bedroom activities to sleep and sex only. Keeping computers, TVs, and work materials out of the room will strengthen the mental association between your bedroom and sleep.    #3 Establish a Soothing Pre-Sleep Routine  Light reading before bed is a good way to prepare yourself for sleep.  Ease the transition from wake time to sleep time with a period of relaxing activities an hour or so before bed. Take a bath (the rise, then fall in body temperature promotes drowsiness), read a book, watch television, or practice relaxation exercises. Avoid " "stressful, stimulating activities--doing work, discussing emotional issues. Physically and psychologically stressful activities can cause the body to secrete the stress hormone cortisol, which is associated with increasing alertness. If you tend to take your problems to bed, try writing them down--and then putting them aside.     #4 Go to Sleep When Youre Truly Tired  Struggling to fall sleep just leads to frustration. If youre not asleep after 20 minutes, get out of bed, go to another room, and do something relaxing, like reading or listening to music until you are tired enough to sleep.    #5 Dont Be a Nighttime Clock-Watcher  Staring at a clock in your bedroom, either when you are trying to fall asleep or when you wake in the middle of the night, can actually increase stress, making it harder to fall asleep. Turn your clocks face away from you.     And if you wake up in the middle of the night and cant get back to sleep in about 20 minutes, get up and engage in a quiet, restful activity such as reading or listening to music. And keep the lights dim; bright light can stimulate your internal clock. When your eyelids are drooping and you are ready to sleep, return to bed.    #6 Use Light to Your Advantage  Natural light keeps your internal clock on a healthy sleep-wake cycle. So let in the light first thing in the morning and get out of the office for a sun break during the day.     #7 Keep Your Internal Clock Set with a Consistent Sleep Schedule  Having a regular sleep schedule helps to ensure better quality and consistent sleep.  Going to bed and waking up at the same time each day sets the bodys "internal clock" to expect sleep at a certain time night after night. Try to stick as closely as possible to your routine on weekends to avoid a Monday morning sleep hangover. Waking up at the same time each day is the very best way to set your clock, and even if you did not sleep well the night before, the extra " sleep drive will help you consolidate sleep the following night. Learn more about the importance of synchronizing the clock in The Drive to Sleep and Our Internal Clock.     #8 Nap Early--Or Not at All  Many people make naps a regular part of their day. However, for those who find falling asleep or staying asleep through the night problematic, afternoon napping may be one of the culprits. This is because late-day naps decrease sleep drive. If you must nap, its better to keep it short and before 5 p.m.    #9 Lighten Up on Evening Meals  Eating a pepperoni pizza at 10 p.m. may be a recipe for insomnia. Finish dinner several hours before bedtime and avoid foods that cause indigestion. If you get hungry at night, snack on foods that (in your experience) won't disturb your sleep, perhaps dairy foods and carbohydrates.    #10 Balance Fluid Intake  Drink enough fluid at night to keep from waking up thirsty--but not so much and so close to bedtime that you will be awakened by the need for a trip to the bathroom.    #11 Exercise Early  Exercise helps promote restful sleep if it is done several hours before you go to bed.  Exercise can help you fall asleep faster and sleep more soundly--as long as it's done at the right time. Exercise stimulates the body to secrete the stress hormone cortisol, which helps activate the alerting mechanism in the brain. This is fine, unless you're trying to fall asleep. Try to finish exercising at least three hours before bed or work out earlier in the day.    #12 Follow Through  Some of these tips will be easier to include in your daily and nightly routine than others. However, if you stick with them, your chances of achieving restful sleep will improve. That said, not all sleep problems are so easily treated and could signify the presence of a sleep disorder such as apnea, restless legs syndrome, narcolepsy, or another clinical sleep problem. If your sleep difficulties dont improve through  good sleep hygiene, you may want to consult your physician or a sleep specialist.
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
## 2023-02-16 ENCOUNTER — OFFICE VISIT (OUTPATIENT)
Dept: NEUROLOGY | Facility: CLINIC | Age: 68
End: 2023-02-16
Payer: MEDICARE

## 2023-02-16 DIAGNOSIS — F43.23 ADJUSTMENT DISORDER WITH MIXED ANXIETY AND DEPRESSED MOOD: ICD-10-CM

## 2023-02-16 DIAGNOSIS — R41.3 MEMORY DEFICIT: Primary | ICD-10-CM

## 2023-02-16 DIAGNOSIS — S06.0X0A CONCUSSION WITHOUT LOSS OF CONSCIOUSNESS, INITIAL ENCOUNTER: ICD-10-CM

## 2023-02-16 PROCEDURE — 96132 NRPSYC TST EVAL PHYS/QHP 1ST: CPT | Mod: S$GLB,,, | Performed by: CLINICAL NEUROPSYCHOLOGIST

## 2023-02-16 PROCEDURE — 96133 PR NEUROPSYCHOLOGIC TEST EVAL SVCS, EA ADDTL HR: ICD-10-PCS | Mod: S$GLB,,, | Performed by: CLINICAL NEUROPSYCHOLOGIST

## 2023-02-16 PROCEDURE — 96139 PSYCL/NRPSYC TST TECH EA: CPT | Mod: S$GLB,,, | Performed by: CLINICAL NEUROPSYCHOLOGIST

## 2023-02-16 PROCEDURE — 96139 PR PSYCH/NEUROPSYCH TEST ADMIN/SCORING, BY TECH, 2+ TESTS, EA ADDTL 30 MIN: ICD-10-PCS | Mod: S$GLB,,, | Performed by: CLINICAL NEUROPSYCHOLOGIST

## 2023-02-16 PROCEDURE — 3044F PR MOST RECENT HEMOGLOBIN A1C LEVEL <7.0%: ICD-10-PCS | Mod: CPTII,S$GLB,, | Performed by: CLINICAL NEUROPSYCHOLOGIST

## 2023-02-16 PROCEDURE — 99999 PR PBB SHADOW E&M-EST. PATIENT-LVL I: ICD-10-PCS | Mod: PBBFAC,,, | Performed by: CLINICAL NEUROPSYCHOLOGIST

## 2023-02-16 PROCEDURE — 4010F ACE/ARB THERAPY RXD/TAKEN: CPT | Mod: CPTII,S$GLB,, | Performed by: CLINICAL NEUROPSYCHOLOGIST

## 2023-02-16 PROCEDURE — 96132 PR NEUROPSYCHOLOGIC TEST EVAL SVCS, 1ST HR: ICD-10-PCS | Mod: S$GLB,,, | Performed by: CLINICAL NEUROPSYCHOLOGIST

## 2023-02-16 PROCEDURE — 96138 PSYCL/NRPSYC TECH 1ST: CPT | Mod: S$GLB,,, | Performed by: CLINICAL NEUROPSYCHOLOGIST

## 2023-02-16 PROCEDURE — 99499 UNLISTED E&M SERVICE: CPT | Mod: S$GLB,,, | Performed by: CLINICAL NEUROPSYCHOLOGIST

## 2023-02-16 PROCEDURE — 3044F HG A1C LEVEL LT 7.0%: CPT | Mod: CPTII,S$GLB,, | Performed by: CLINICAL NEUROPSYCHOLOGIST

## 2023-02-16 PROCEDURE — 4010F PR ACE/ARB THEARPY RXD/TAKEN: ICD-10-PCS | Mod: CPTII,S$GLB,, | Performed by: CLINICAL NEUROPSYCHOLOGIST

## 2023-02-16 PROCEDURE — 96133 NRPSYC TST EVAL PHYS/QHP EA: CPT | Mod: S$GLB,,, | Performed by: CLINICAL NEUROPSYCHOLOGIST

## 2023-02-16 PROCEDURE — 96138 PR PSYCH/NEUROPSYCH TEST ADMIN/SCORING, BY TECH, 2+ TESTS, 1ST 30 MIN: ICD-10-PCS | Mod: S$GLB,,, | Performed by: CLINICAL NEUROPSYCHOLOGIST

## 2023-02-16 PROCEDURE — 99499 NO LOS: ICD-10-PCS | Mod: S$GLB,,, | Performed by: CLINICAL NEUROPSYCHOLOGIST

## 2023-02-16 PROCEDURE — 99999 PR PBB SHADOW E&M-EST. PATIENT-LVL I: CPT | Mod: PBBFAC,,, | Performed by: CLINICAL NEUROPSYCHOLOGIST

## 2023-02-17 NOTE — PROGRESS NOTES
NEUROPSYCHOLOGICAL EVALUATION - CONFIDENTIAL    Referring Provider: Mervin Ramos MD  Medical Necessity: Evaluate cognitive and emotional functioning, participate in treatment planning/management, and provide supportive therapy in the setting of concussion   Date Conducted: 2/6/2023 & 2/16/2023  Present At Visit: the patient and her partner, Eleuterio  Referral Diagnoses: S06.0X0A (ICD-10-CM) - Concussion without loss of consciousness, initial encounter     R41.3 (ICD-10-CM) - Memory deficit  Consent: The patient expressed an understanding of the purpose of the evaluation and consented to all procedures. She additionally provided consent to speak with her partner, Eleuterio, who was present during the clinical interview. We discussed the limits of confidentiality and discussed an emergency plan.    ASSESSMENT & PLAN:   Ms. Kassy Delgado is an 67 y.o., female with 12 years of education and pertinent medical history including acoustic neuroma s/p GSRS (14 Gy to the 50% isodose line) done on 7/14/21, hearing loss in the left ear, dizziness, numbness to the left side of her face mostly to her cheek and the tongue, and swallow difficulty who was referred for a neuropsychological evaluation in the setting of memory changes.       As stated below, scores on stand-alone and embedded performance validity measures were variable with below cutoff performances on a stand alone measure. The current results, therefore, may be an underestimate of the patient's current functioning and testing will be interpreted with caution.     Compared to average range premorbid estimates (based on both demographic information and a word reading test), results of the current evaluation reveal intact language skills, visuospatial constructional skills, and simple attention. Variability is seen in several subcortical areas of functioning, including working memory (average to below average), processing speed (low average to below average),  executive functioning (average to below average), and learning and memory (normal learning and memory scores on one measure, low learning on another, and low free recall on a third measure; benefit from recognition cueing). Temporal orientation is intact. Psychological screening questionnaires revealed a mild degree of clinically significant depressive symptoms and a moderate degree of clinically significant anxiety symptoms.    Overall, formal neurocognitive disorder diagnosis is deferred. It would be unlikely for the concussion she sustained 6 years ago to have any lasting cognitive sequelae, as research shows the contrary (i.e., a full return to cognitive baseline). There is some literature showing that acoustic neuroma can lead to some mild changes in thinking, however, there is no lateralization pattern seen, which would also argue against this etiology.    There is an incidental finding of two remote infarcts on neuroimaging (left occipital lobe and right cerebellum), both of which date back to the oldest MRI brain records on file (2020). Ms. Delgado could certainly be noticing changes related to her vascular health, particularly if these infarcts occurred around the time of that MRI (which would be right around the time that they noticed more of a change in Ms. Delgado's thinking). In addition, her significant symptoms of depression and anxiety are likely impacting her cognitive efficiency. Specifically, depression and anxiety often impact individuals with ADD to a greater degree than individuals without (i.e., they exacerbate her symptoms of ADD), making it very important to treat these factors as treatment leads to improvement in thinking. Ms. Delgado and her partner also questioned if the stress from her daughter's traumatic death 17 years ago could be playing a part in what she is going through. It's certainly possible and if this is indeed the case, then treatment of psychological  "factors will also help to alleviate the impact this trauma could be having on cognitive functioning. The following recommendations are offered:     I believe Ms. Delgado would significantly benefit from starting a trial of an SSRI (if not medically contraindicated) and returning to talk therapy/counseling. She is encouraged to discuss medication options with her prescribing physicians. If she is interested in pursuing therapy services through [facility], I can place a referral. Otherwise she is encouraged to explore [email or web address] to find a list of community providers.   Strongly encouraged to focus on management of vascular risk factors (information included at the end of this report).   Information on brain health behaviors, cognitive tips and strategies, and a list of brain training applications with research showing they help to improve cognition are included at the end of this report.  Ms. Delgado is welcome to return for re-evaluation if she notices thinking changes persist despite implementation of the treatment plan. She is also welcome to return for a check-in at any time to update treatment planning.       Problem List Items Addressed This Visit          Psychiatric    Adjustment disorder with mixed anxiety and depressed mood     Other Visit Diagnoses       Memory deficit    -  Primary    Concussion without loss of consciousness, initial encounter              Thank you for allowing me to assist in Ms. Kassy Delgado's care. If you have any questions, please contact me at [phone].      Tiny Bull, PhD  Licensed Clinical Neuropsychologist  [facility]     CLINICAL INTERVIEW & RECORD REVIEW:     Cognitive Functioning   Cognitive screener: none  Previous evaluation(s): none  Onset & course of difficulty: ADD diagnosed when she was in her 50s. Never took a stimulant. Sustained a concussion 6 years ago that "knocked her " "eyesight out for 3 days." She and Eleuterio state that she improved, but did not fully return to baseline after this injury. Has noticed more thinking changes over the past two years that seem to have worsened over the past year.    Fluctuations: none  Severity of changes: some could be age-related   Examples:   Attention/Working Memory/Executive Functioning: ADD diagnosed when she was in her 50s. Never took a stimulant. Hyper focuses on interests and blocks out the world >> this is getting worse. Otherwise jumps from one topic to the next. Very easily bored and distracted. If she starts something, normally does a good job finishing it. Her greenhouse and kitchen are extremely well organized, Everything else is less organized. Harder time planning ahead and Eleuterio does 95% of planning.   Processing Speed: slower   Language: sometimes has word finding problems.   Visuospatial: hasn't driven in 3 years. Too dizzy to drive. Situational awareness and sense of what is around her is very bad. Has to be careful to not walk into things because she is not aware of what is there. Vision is okay but brain isn't putting it together that something is there. Tracking and reading is okay, just taking more concentration. Says she is glad she doesn't drive anymore because she doesn't feel like she can't read the signs anymore because the car is moving too fast   Learning & Memory: long term memory is good, but sometimes difficulty pulling up information. Cues usually jog her memory.   Exacerbating factors: none  Ameliorating factors: none  Medication for cognition: none     Daily Functioning   ADLs:    Bathing: Independent and without difficulty  Dressing: Independent and without difficulty  Grooming: Independent and without difficulty   Toileting: Independent and without difficulty  Transferring: Independent and without difficulty.  Eating: Independent and without difficulty.   IADLs:    Finances: Eleuterio manages the bills.  Medication " "Mgmt: Independent and without difficulty  Driving: doesn't drive due to dizziness   Household Mgmt: Independent and without difficulty Cooking/Meal Preparation: Eleuterio cooks. She collaborates on meals and does okay with it.   Shopping: Independent and without difficulty.  Appointment Mgmt: Independent and without difficulty     Psychiatric/Neuropsychiatric Symptoms   Mood: "fine"  Depression: no  Chantell/Hypomania: no  Anxiety: no  Stress: very low other than health concerns   Social Withdrawal: no  Neurovegetative Sxs:  Appetite: stable, but swallowing difficulty interferes.   Sleep: sleeps soundly. Not acting out dreams. Does have an occasional night when doesn't sleep at all. 5 to 7 hours. Sleep apnea has been diagnosed and has a CPAP machine and used it one night but it made the bronchitis worse so not using it.  Has nighttime oxygen and uses that one.   Energy: okay, doesn't have the same stamina as before   Hallucinations: no  Delusional/Paranoid Thinking: no  Impulsivity: no  Obsessive/Compulsive Behaviors: no  Disinhibition: no  Irritability/Agitation: no  Aggression: no  Apathy/Indifference: no  Other changes in personality: no. Very caring person. Puts everyone else first.      Physical Functioning   Tremor: sometimes when holding her phone her left hand shakes. Difficulty brushing her teeth.   Difficulty walking: bad due to dizziness. Using a cane now. Has a wheelchair if a lot of walking is involved.   Imbalance: holding onto arm of Eleuterio  Falls: no  Weakness: yes  Trouble with fine motor movements: yes. Having more problems with both hands which is worsening. Can't sew like she used to. Pretty immobile right now.   Other: Almost whole left side of body feels like it's not related to the right side of her body.  Lightheadedness: dizziness  Urinary Urgency: has had a few accidents, both bladder and bowel. Leakage.   Sensory Sxs: taste and smell are good. Vision is good. Hearing reduced - almost completely " gone in left ear.   Pain: not really  Physical Exercise Routine: can't due to dizziness   Other: Swallowing difficulty that is worsening. Started before the gamma knife but about a month after it started worsening. Difficulty swishing liquid around in her mouth.        RELEVANT HISTORY  This patient has a past medical history of Acoustic neuroma and Arthritis.    Past Surgical History:   Procedure Laterality Date    bilateral shoulder arthroplasty      HYSTERECTOMY      REWIRING OF STERNUM      right hip arthroplasty      right knee arthroplasty       Neurological History    Headaches/Migraines: yes - history of migraines. Hasn't had any in years.   TBI:   concussion 6 years ago with LOC for unknown duration. Knocked out her eyesight for 3 days.   3 concussions before that, but did well.   Seizures: two seizures in the last 6 to 8 years (about a year apart from one another) - took Keppra until she told the doctor she wasn't taking it anymore. Abruptly stopped taking gabapentin. No seizures since that time.   Stroke: incidental finding of two remote infarcts on imaging (left occipital lobe and right cerebellum). Both date back to the oldest MRI brain records on file, which is 2020.   Tumor: acoustic neuroma s/p GSRS (14 Gy to the 50% isodose line) done on 7/14/21   Previous Episodes of Delirium: no  Movement Disorder: no  CNS Infection: no  Other: no       Neurodiagnostics     Results for orders placed or performed during the hospital encounter of 11/15/22   MRI Brain W WO Contrast    Narrative    EXAMINATION:  MRI BRAIN W WO CONTRAST    CLINICAL HISTORY:  acoustic neuroma; Benign neoplasm of cranial nerves    TECHNIQUE:  Multiplanar multisequence MR imaging of the brain was performed before and after the administration of 10 mL Gadavist intravenous contrast.    COMPARISON:  10/19/2021    FINDINGS:  Enhancing extra-axial lesion extending through the left internal auditory canal with rounded cystic component  protruding into the CP angle cistern.  The cisternal component as decreased in size from prior examination, now measuring on the order of 1.4 x 1.2 cm (previously 1.7 x 1.5).  Mass effect on the lateral bhaskar slightly improved.  Some subtle T2 hyperintensity within the lateral margin of the bhaskar adjacent to the lesion.  No new enhancement..  The intracanalicular component is stable.  No new enhancement within the adjacent labyrinth in structures.    The right cranial nerve 7 8 complex unremarkable.    The brain appears stable.  Small remote left occipital infarct.  Small remote right cerebellar infarct.  No new major vascular distribution infarct.  No recent or remote hemorrhage.  New edema.  No abnormal parenchymal or leptomeningeal enhancement.    No new extra-axial blood or fluid collections.    The arterial T2 skull base flow voids are preserved.    Bone marrow signal intensity unremarkable.    Bilateral pseudophakia.      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  Subtle parenchymal T2 hyperintensity in the adjacent lateral bhaskar may reflect treatment effects, but there is no corresponding enhancement.    Remote left occipital and right cerebellar infarcts, stable.      Electronically signed by: Tal Elena MD  Date:    11/15/2022  Time:    13:14     Pertinent Lab Work     Lab Results   Component Value Date    SAVHRSMB96 206 03/01/2023     No results found for: RPR  No results found for: FOLATE  Lab Results   Component Value Date    TSH 2.777 03/01/2023    H1DZHWC 84 03/01/2023     Lab Results   Component Value Date    HGBA1C 5.8 (H) 03/01/2023     No results found for: HIV1X2, YFF12ORNR    Medications     Current Outpatient Medications   Medication Instructions    albuterol (PROVENTIL/VENTOLIN HFA) 90 mcg/actuation inhaler 2  puffs, Inhalation, Every 4 hours PRN    losartan (COZAAR) 100 mg, Oral     Psychiatric History   Prior Diagnoses: none  History of Trauma/Abuse: daughter committed suicide 17 years ago - this trauma has kept her distracted, so they wonder if that is part of what she is going through.   History of Suicide Attempts: no  Current Ideation, Intention, or Plan: no  Homicidal Ideation: no   Medication(s): no  Hospitalization(s): no  Psychotherapy/Counseling: saw a psychologist for 5/6 years around time of daughter's suicide.   Other: no         Substance Use History     Social History     Tobacco Use    Smoking status: Never    Smokeless tobacco: Not on file   Substance and Sexual Activity    Alcohol use: Not on file    Drug use: Not on file    Sexual activity: Not on file     History of abuse/overuse: usually drinks a glass of wine with her meal at night. Quit smoking 10 or 11 years ago. No issues with alcohol or drugs.     Family Neurological & Psychiatric History     No family history on file.  Neurologic: Negative for heritable risk factors.   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 She has two children, late daughter and son who is 40 years old. 4 grandchildren.   Support System: good  Hobbies/Activities: likes to garden and travel, go out to eat, visit with friends  Current Living Situation: lives at home with Eleuterio      OBJECTIVE:  "    MENTAL STATUS AND OBSERVATIONS:   Appearance: Appropriate to setting    Alertness: Alert but required frequent prompts and redirection back to the test material.   Orientation:   With the exception of being off by 1 for the date one time ("7th" when it was 6th), she was O x 4 across both evaluation days.   Gait:  Ms. Delgado arrived in a wheelchair on the day of testing, but she used a cane to assist her while walking to the testing chair.   Psychomotor:  Tremor observed in her left hand. She reported that her hands go limp and she has difficulty writing, but she did not have any difficulty writing or drawing during the evaluation.    Handedness:  Right   Vision & Hearing:  She reported cataract surgery has impaired her vision and she has some hearing issues. She did not have any trouble seeing test stimuli or hearing the examiner.    Speech/language: Normal in rate, rhythm, tone, and volume. No significant word finding difficulty observed. Comprehension was normal during the clinical interview. She asked for some repetition and clarification of complex task instructions to ensure her comprehension during testing.    Mood/Affect:  The patients stated mood was "fine." Affect was congruent with stated mood.    Interpersonal Behavior:  Rapport was quickly and easily established    Suicidality/Homicidality: Denied   Hallucinations/Delusions:  None evidenced or endorsed   Thought Content: Logical   Though Processes: Goal-directed   Insight & Judgment:  Appropriate   Participation in Interview:  Full + collateral     PROCEDURES/TESTS ADMINISTERED: In addition to performing a review of pertinent medical records, reviewing limits to confidentiality, conducting a clinical interview, and explaining procedures, the following measures were administered by ARLETTE Richard, a trained psychometrician/psychometrist under the direct supervision of Dr. Bull: MSVT; Dot Counting (DCT); Test of Premorbid Functioning " "(TOPF); Wechsler Adult Intelligence Scale, Fourth Edition (WAIS-IV) [Digit Span, Arithmetic, Symbol Search, and Coding subtests]; Wechsler Memory Scale, Fourth Edition (WMS-IV) [Logical Memory subtest]; Daly Verbal Learning Test-Revised (HVLT-R; Form 1); Brief Visuospatial Memory Test-Revised (BVMT-R, form 1); Neuropsychological Assessment Battery (NAB) [Naming subtest, form 1]; Verbal fluency tests (FAS & animal naming; Katie et al., 2004 norms); Jorge Complex Figure Test (RCFT) [copy only]; Trail Making Test, parts A and B (Katie et al., 2004 norms); [place] Card Sorting Test -64 card version (WCST-64); Geriatric Depression Scale (GDS-30); and Generalized Anxiety Disorder - 7 Item Scale (SHWETHA-7). Manual norms were used unless otherwise indicated.      TEST TAKING BEHAVIOR AND VALIDITY: At the start of testing, the patient informed the examiner that she gets dizzy. She had a slight voice tremor and she often took deep, wheezing breaths. She repeated responses on a word list learning task and speeded verbal fluency measure. During digit sequencing tasks she commented, "Numbers overwhelm me...My brain is getting overwhelmed...I have ADD." She took a short break in between sections. She appeared to give up easily on a mental arithmetic task and she was unwilling to guess even after provided with additional encouragement from the examiner. She skipped some responses while completing a number symbol matching task and required redirection. She erased frequently while making a copy of a complex geometric figure drawing. While her approach was poorly planned, the gestalt was intact. The lower right quadrant was slightly distorted. She had difficulty remembering to switch between two rule sets on a divided attention task. She appeared to rush at times throughout the session and interrupted the examiner periodically. Scores on stand-alone and embedded performance validity measures were variable with below cutoff " performances on a stand alone measure. The current results, therefore, may be an underestimate of the patient's current functioning and testing is therefore interpreted with caution.    TEST RESULTS    Raw Score Type of Standardized Score Standardized Score Percentile/CP Descriptor   MSVT IR 75 - - - -   MSVT DR 85 - - - -   MSVT Cons 70 - - - -   MSVT  - - - -   MSVT FR 40 - - - -   Dot Counting Escore 15 - - - -   ACS LM II Rec 26 - - - -   ACS RDS 9 - - - -   HVLT-R Recognition Discrimination 7 - - - -   PREMORBID FUNCTIONING Raw Score Type of Standardized Score Standardized Score Percentile/CP Descriptor   TOPF simple dem. eFSIQ -  55 Average   TOPF pred. eFSIQ -  66 Average   TOPF simple + pred. eFSIQ -  61 Average   LANGUAGE FUNCTIONING Raw Score Type of Standardized Score Standardized Score Percentile/CP Descriptor   TOPF Word Reading 51  70 Average   NAB Naming 30 Tscore 57 76 High Average   FAS 29 Tscore 43 25 Average   Animal Naming 20 Tscore 52 58 Average   VISUOSPATIAL FUNCTIONING Raw Score Type of Standardized Score Standardized Score Percentile/CP Descriptor   RCFT Copy 25 - - <1 Exceptionally Low   RCFT Time to Copy 284 - - >16 WNL   BVMT-R Copy 10/12 - - - -   LEARNING & MEMORY Raw Score Type of Standardized Score Standardized Score Percentile/CP Descriptor   HVLT-R         Total Immediate (3, 7, 9) 19 Tscore 41 18 Low Average   Delayed Recall 4 Tscore 33 4 Below Average   Retention % 44 Tscore 28 1 Exceptionally Low    Hits 9 - - - -   False Positives 2 - - - -   Discrimination  7 Tscore 31 3 Below Average   WMS-IV Subtests         LM I 23 ss 9 37 Average   LM II 19 ss 9 37 Average   LM Recognition 26 - - >75 High Average   BVMT-R         IR (2, 4, 4) 10 Tscore 30 2 Below Average   DR 4 Tscore 32 4 Below Average   Discrimination Index 6 - - >16 WNL   ATTENTION/WORKING MEMORY Raw Score Type of Standardized Score Standardized Score Percentile/CP Descriptor   WAIS-IV WMI -  SS 80 9 Low Average   WAIS-IV Digit Span 22 ss 8 25 Average         DS Forward 10 ss 10 50 Average         DS Backward 6 ss 8 25 Average         DS Sequence 6 ss 8 25 Average         Longest Digit Forward 7 - - - -         Longest Digit Backward 3 - - - -         Longest Digit Sequence 4 - - - -   WAIS-IV Arithmetic 8 ss 5 5 Below Average   MENTAL PROCESSING SPEED Raw Score Type of Standardized Score Standardized Score Percentile/CP Descriptor   WAIS-IV PSI - SS 79 8 Below Average   WAIS-IV Symbol Search 16 ss 6 9 Low Average   WAIS-IV Coding 33 ss 6 9 Low Average   TMT A  66 Tscore 31 3 Below Average   TMT A errors 0 - - - -   EXECUTIVE FUNCTIONING Raw Score Type of Standardized Score Standardized Score Percentile/CP Descriptor   TMT B 171 Tscore 34 5 Below Average   TMT B errors 1 - - - -   WCST-64         Total Correct 29 SS - - -   Total Errors 35 SS 76 5 Below Average   Perseverative Resp. 41 SS 69 2 Below Average   Perseverative Err. 32 SS 69 2 Below Average   Nonperseverative Err. 3  87 High Average   Concept. Level Response 20 SS 78 7 Below Average   Categories Completed 2 - - 11-16 Low Average   FMS 0 - - - WNL   Learning to Learn -17.15 - - 11-16 Low Average   MOOD & PERSONALITY Raw Score Type of Standardized Score Standardized Score Percentile/CP Descriptor   GDS-30 10 - - - Mild   SHWETHA-7 10 - - - Moderate   ss = scaled score (mean = 10, SD = 3); SS = standard score (mean = 100, SD = 15); Tscore mean = 50, SD = 10; zscore (mean = 0.00, SD = 1)  It is important to note that scores/percentiles should only be interpreted by a neuropsychologist. It is common for healthy individuals to have 1-3 isolated low/unusual scores that are not indicative of any significant cognitive dysfunction.       BILLING  Code Description Minutes Units   90002 Psychiatric Interview 0    25625 Nubhvl xm phys/qhp 1st hr 0    50108 Nubhvl xm phy/qhp ea addl hr 0    96574 Psycl tst eval phys/qhp 1st 0    75812 Psycl tst eval  phys/qhp ea 0    89634 Nrpsyc tst eval phys/qhp 1st 60 1   29584 Nrpsyc tst eval phys/qhp ea 157 3     Referral review/test selection 30      Tech consult/test review/modifications 10      Patient limitation management 0      Patient behavior management 0      Patient symptom monitoring 0      Record Review/Integration/Report Generation 146      Face-to-Face interpretive Feedback 31    64753 Psycl/nrpsyc tst phy/qhp 1st 0    33053 Psycl/nrpsyc tst phy/qhp ea 0    22195 Psycl/nrpsyc tech 1st 30 1   10586 Psycl/nrpsyc tst tech ea 133 4

## 2023-03-01 ENCOUNTER — LAB VISIT (OUTPATIENT)
Dept: LAB | Facility: HOSPITAL | Age: 68
End: 2023-03-01
Attending: PSYCHIATRY & NEUROLOGY
Payer: MEDICARE

## 2023-03-01 DIAGNOSIS — E53.1 PYRIDOXINE DEFICIENCY: ICD-10-CM

## 2023-03-01 DIAGNOSIS — D33.3 ACOUSTIC NEUROMA: ICD-10-CM

## 2023-03-01 DIAGNOSIS — G60.3 IDIOPATHIC PROGRESSIVE NEUROPATHY: ICD-10-CM

## 2023-03-01 DIAGNOSIS — G62.9 PERIPHERAL POLYNEUROPATHY: ICD-10-CM

## 2023-03-01 DIAGNOSIS — E55.9 VITAMIN D DEFICIENCY, UNSPECIFIED: ICD-10-CM

## 2023-03-01 DIAGNOSIS — E08.40 DIABETES MELLITUS DUE TO UNDERLYING CONDITION WITH DIABETIC NEUROPATHY, WITHOUT LONG-TERM CURRENT USE OF INSULIN: ICD-10-CM

## 2023-03-01 LAB
25(OH)D3+25(OH)D2 SERPL-MCNC: 10 NG/ML (ref 30–96)
CREAT SERPL-MCNC: 1.1 MG/DL (ref 0.5–1.4)
EST. GFR  (NO RACE VARIABLE): 54.7 ML/MIN/1.73 M^2
ESTIMATED AVG GLUCOSE: 120 MG/DL (ref 68–131)
HBA1C MFR BLD: 5.8 % (ref 4–5.6)
T3 SERPL-MCNC: 84 NG/DL (ref 60–180)
TSH SERPL DL<=0.005 MIU/L-ACNC: 2.78 UIU/ML (ref 0.4–4)

## 2023-03-01 PROCEDURE — 82306 VITAMIN D 25 HYDROXY: CPT | Performed by: PSYCHIATRY & NEUROLOGY

## 2023-03-01 PROCEDURE — 82300 ASSAY OF CADMIUM: CPT | Performed by: PSYCHIATRY & NEUROLOGY

## 2023-03-01 PROCEDURE — 82565 ASSAY OF CREATININE: CPT | Performed by: NEUROLOGICAL SURGERY

## 2023-03-01 PROCEDURE — 84252 ASSAY OF VITAMIN B-2: CPT | Performed by: PSYCHIATRY & NEUROLOGY

## 2023-03-01 PROCEDURE — 83036 HEMOGLOBIN GLYCOSYLATED A1C: CPT | Performed by: PSYCHIATRY & NEUROLOGY

## 2023-03-01 PROCEDURE — 84443 ASSAY THYROID STIM HORMONE: CPT | Performed by: PSYCHIATRY & NEUROLOGY

## 2023-03-01 PROCEDURE — 84480 ASSAY TRIIODOTHYRONINE (T3): CPT | Performed by: PSYCHIATRY & NEUROLOGY

## 2023-03-01 PROCEDURE — 84425 ASSAY OF VITAMIN B-1: CPT | Performed by: PSYCHIATRY & NEUROLOGY

## 2023-03-01 PROCEDURE — 83921 ORGANIC ACID SINGLE QUANT: CPT | Performed by: PSYCHIATRY & NEUROLOGY

## 2023-03-01 PROCEDURE — 84207 ASSAY OF VITAMIN B-6: CPT | Performed by: PSYCHIATRY & NEUROLOGY

## 2023-03-01 PROCEDURE — 82607 VITAMIN B-12: CPT | Performed by: PSYCHIATRY & NEUROLOGY

## 2023-03-02 LAB — VIT B12 SERPL-MCNC: 206 NG/L (ref 180–914)

## 2023-03-03 LAB
ARSENIC BLD-MCNC: 1 NG/ML
CADMIUM BLD-MCNC: 0.3 NG/ML
CITY: NORMAL
COUNTY: NORMAL
GUARDIAN FIRST NAME: NORMAL
GUARDIAN LAST NAME: NORMAL
HOME PHONE: NORMAL
LEAD BLD-MCNC: <1 MCG/DL
MERCURY BLD-MCNC: <1 NG/ML
RACE: NORMAL
STATE: NORMAL
STREET ADDRESS: NORMAL
VENOUS/CAPILLARY: NORMAL
ZIP: NORMAL

## 2023-03-04 LAB — VIT B2 SERPL-MCNC: 3 MCG/L (ref 1–19)

## 2023-03-06 LAB — METHYLMALONATE SERPL-SCNC: 0.22 NMOL/ML

## 2023-03-07 LAB — PYRIDOXAL SERPL-MCNC: 8 UG/L (ref 5–50)

## 2023-03-08 LAB — VIT B1 BLD-MCNC: 68 UG/L (ref 38–122)

## 2023-03-09 PROBLEM — F43.23 ADJUSTMENT DISORDER WITH MIXED ANXIETY AND DEPRESSED MOOD: Status: ACTIVE | Noted: 2023-03-09

## 2023-03-09 NOTE — PATIENT INSTRUCTIONS
COMPLIANCE WITH CPAP MACHINE  Untreated or partially treated sleep apnea can negatively impact your cognition, both acutely and chronically. It is important to maintain as close to 100% compliance as possible when using your machine.    MANAGING VASCULAR RISK FACTORS  A personal history of disorders that affect the cardiovascular system (e.g., hypertension, high cholesterol, diabetes, heart disease) can have a negative impact on brain functioning especially over many years. Therefore, it is very important for this patient to maintain good control over his/her risk factors. The following is recommended:  Take all medications as prescribed and follow-up with recommendations above.  Get regular physical exercise to the extent that it is possible. Family may need to structure this into their loved one's day or week and develop a transportation plan.  Eat a well-balanced diet and following the MIND diet (see handout) has been shown to be most brain protective.   Check your blood pressure, cholesterol levels, blood sugar, and others as appropriate.    PRACTICE GOOD COGNITIVE HYGIENE  Engage in regular exercise, which increases alertness and arousal and can improve attention and focus.  Consider lower impact exercises, such as yoga or light walking. Try to exercise for at least 150 minutes per week  Get a good night's sleep, as this can enhance alertness and cognition.  Eat healthy foods and balanced meals. It is notable that research indicates certain nutrients may aid in brain function, such as B vitamins (especially B6, B12, and folic acid), antioxidants (such as vitamins C and E, and beta carotene), and Omega-3 fatty acids. Here are some common tips for diet (Adopted from Sabiha et al, [facility], 2018):  Eat primarily plant-based foods, such as fruits and vegetables, whole grains, legumes   (beans) and nuts.  Limit refined carbohydrates (white pasta, bread, rice).  Replace butter with healthy fats such as olive oil.  Use  herbs and spices instead of salt to flavor foods.  Limit red meat and processed meats to no more than a few times a month.  Avoid sugary sodas, bakery goods, and sweets.  Eat fish and poultry at least twice a week.  Keep your brain active. Find activities to stay mentally active, such as reading, games (cards, checkers), puzzles (crosswords, Sudoku, jig saw), crafts (models, woodworking), gardening, or participating in activities in the community.  Stay socially engaged. Continue staying active with your family and friends.    RESOURCE  Consider purchasing the book, High-Octane Brain: 5 Science-Based Steps to Sharpen Your Memory and Reduce Your Risk of Alzheimer's by Dr. Lucretia Skaggs.    Consider purchasing the book, Bouncing Back: Skills for Adaptation to Injury, Aging, Illness, and Pain by Harsha Mack, PhD    Consider purchasing the book, Smart but Scattered Guide to Success: How to Use Your Brain's Executive Skills to Keep Up, Stay Calm, and Get Organized at Work and at Home by Andra Omalley EdD and Harsha Camacho, PhD    COGNITIVE TIPS AND STRATEGIES  The following tips and strategies are provided to help assist in daily activities:      Attention: Remember that inattention and lack of focus are major culprits to forgetting information so be sure and practice paying attention for adequate learning of information. If you rely on passive attention to remembering something (e.g., yeah, uh-huh approach), you'll find you cannot recall it later. I recommend the following to improve attention, which may aid in later recall:  1. Reduce distractions in the area as much as possible  2. Look at the person as they are speaking to you.   3. Paraphrase as they are speaking  4. Write down important pieces of information   5. Ask them to repeat if you zone out.  6. Have them simplify and reduce information that you need to attend to during conversation.  7. Have visual cues to remind you if you need to do something  later.     Processing Speed:  1. Using multiple modalities (e.g., listening, writing notes, asking questions, recording) to learn new information is likely to allow additional time for processing, thus improving memory for the material.   2. Allowing sufficient time to complete tasks will reduce frustration and help to ensure completion.     Executive Functionin. Don't attempt to multi-task.  Separate tasks so that each can be completed one at a time  2. Consider using a calendar/day planner, as that may be effective to help you plan and stay on track.  Color-coding specific tasks by importance may add additional benefit to your planner  3. Break down large projects into smaller tasks and write down the steps to completing the task.  Taking notes while reading can help with recall.     Storing Information: Use the below strategies to help you further enhance how information is stored  1. Rehearse - Immediately after seeing/hearing something, try to recall it.  Wait a few minutes, then check again.  Gradually lengthen the intervals between rehearsals.  2. Repetition of learned material is critical to ensure storage of information to be learned. Self-test at home to ensure learning.  3. Write down important information to improve your attention and focus and to have something to look back on when you need to recall it.  4. Make sure the person doesn't rattle off, but presents in a clear, logical, and unhurried manner.      Recalling Information:  1. Jog your memory - Lose something?  Think back to when you last had it.  What did you do next?  And after that?  Mentally walk yourself through each activity that followed.  Prodding your memory this way may enable you to recall the location of the missing item.  2. Use a cue - Symbolic reminders (the proverbial string around the finger) are helpful.  So too are memos, timers, calendar notes, etc.--keep them in visible, appropriate place  3. Get organized - Have fixed  locations for all important papers, key phone numbers, medications, keys, wallet, glasses, tools, etc.  4. Develop routines - Routines can anchor memories so they do not drift away.       Word Finding:   Not being able to find a word when you need it is a common and very annoying problem. It is not strictly a memory issue, but more a filing and retrieval issue. You know the word or name you want, but it cannot be found in your brain file. It is like having all the files in your file cabinet emptied on the floor. Every piece of information is there but finding it can be a challenge.   One strategy that may help is working with a speech pathologist/therapist to learn techniques to decrease word finding problems. Some of those strategies/techniques include the following:  Use circumlocutions. Describe the object you're trying to name instead of naming it.  Recite you're A, B, Cs. Go through the alphabet to see if a letter triggers finding the word.  Picture it. Try to visualize the spelling or writing of the word.  Relax. The harder you try to force yourself to come up with the word, the more frustrated you get and the worse you function.   Write it down. In situations where using correct words is critical, such as communicating on the radio while flying, write down the key words so that they are in front of you if needed.  Use word association. For words or names you continually misfile and can't find, try to come up with a word association, something that reminds you of the word or name.  Write a script. Try scripting something important that you want to say. Either write it out or practice it beforehand, so that you get it right.  Play word games. Doing crossword puzzles and playing word finding games may help your brain become more efficient at filing and retrieving words.     BRAIN TRAINING APPS  · [employer] (https://www.[employer]/) - BrainYeapoo has more than two dozen brain-training exercises organized into  six categories: Attention, Brain Speed, Memory, People Skills, Intelligence, and Navigation. It allows you to fit brain exercises into your busy life, and access brain training on most internet-connected devices. Plus, each exercise continuously adapts to your unique performance. So you train at the right level for you.     · Mind Games (https://www.mindgames.com/) - Mind Games is a great collection of games based in part on principles derived from cognitive tasks to help you practice different mental skills. This includes a handful of free games. Additionally, there are a number of trial games included that can be played 3 times. All games include your score history and a graph of your progress. Using some principles of standardized testing, your scores are also converted to a standard scale so that you can see where you need work and excel. The training center does the work for you by picking the perfect mix of exercises to keep you engaged.     · Elevate (https://Plex.com/) - Elevate was selected by [employer] as Gu[employer]o of the Year! Elevate is a brain training program designed to improve focus, speaking abilities, processing speed, memory, math skills, and more. Each person is provided with a personalized training program that adjusts over time to maximize results. Theoretically, the more you train with Elevate, the more you'll improve critical cognitive skills that are proven to boost productivity, earning power, and self-confidence. Users who train at least 3 times per week have reported dramatic gains and increased confidence. In-guicho purchases.     · Peak (https://www.[employer].net/) - Reach Peak performance with over 40 unique games, each one developed by neuroscientists and game experts to challenge your cognitive skills and push you further. Use , the  for your brain, to find the right workout for you at the right time. Choose from 's best recommendations to push your skills to the max.  Or take contextual workouts like Coffee Break if you're short on time.  will help you track your progress using in-depth insights and keep you going when you need it most. Play for free or upgrade to Pro and get the best brain training experience available. In-tammy purchases.     OTHER SUGGESTIONS  Games and Apps like Sudoku; Crossword Puzzles; Word Find; Memory Games; Logic Games; Solitaire; and Hidden Object Mysteries. Find some you like and play them. It will exercise many of the skills necessary to improve function.

## 2023-03-10 ENCOUNTER — OFFICE VISIT (OUTPATIENT)
Dept: NEUROLOGY | Facility: CLINIC | Age: 68
End: 2023-03-10
Payer: MEDICARE

## 2023-03-10 DIAGNOSIS — R41.3 MEMORY DEFICIT: Primary | ICD-10-CM

## 2023-03-10 DIAGNOSIS — F43.23 ADJUSTMENT DISORDER WITH MIXED ANXIETY AND DEPRESSED MOOD: ICD-10-CM

## 2023-03-10 PROCEDURE — 4010F ACE/ARB THERAPY RXD/TAKEN: CPT | Mod: CPTII,95,, | Performed by: CLINICAL NEUROPSYCHOLOGIST

## 2023-03-10 PROCEDURE — 99499 NO LOS: ICD-10-PCS | Mod: 95,,, | Performed by: CLINICAL NEUROPSYCHOLOGIST

## 2023-03-10 PROCEDURE — 4010F PR ACE/ARB THEARPY RXD/TAKEN: ICD-10-PCS | Mod: CPTII,95,, | Performed by: CLINICAL NEUROPSYCHOLOGIST

## 2023-03-10 PROCEDURE — 99499 UNLISTED E&M SERVICE: CPT | Mod: 95,,, | Performed by: CLINICAL NEUROPSYCHOLOGIST

## 2023-03-10 PROCEDURE — 3044F HG A1C LEVEL LT 7.0%: CPT | Mod: CPTII,95,, | Performed by: CLINICAL NEUROPSYCHOLOGIST

## 2023-03-10 PROCEDURE — 3044F PR MOST RECENT HEMOGLOBIN A1C LEVEL <7.0%: ICD-10-PCS | Mod: CPTII,95,, | Performed by: CLINICAL NEUROPSYCHOLOGIST

## 2023-03-10 NOTE — PROGRESS NOTES
NEUROPSYCHOLOGICAL EVALUATION FEEDBACK    TELEMEDICINE DETAILS:   Established Patient - Audio Only Telehealth Visit   The patient location is: [place]  The chief complaint leading to consultation is: feedback regarding neuropsychological test results  Visit type: Virtual visit with audio only (telephone)  Total time spent with patient: 45 minutes   The reason for the audio only service rather than synchronous audio and video virtual visit was related to technical difficulties or patient preference/necessity.   Each patient to whom I provide medical services by telemedicine is: (1) informed of the relationship between the physician and patient and the respective role of any other health care provider with respect to management of the patient; and (2) notified that they may decline to receive medical services by telemedicine and may withdraw from such care at any time. Patient verbally consented to receive this service via voice-only telephone call.       Kassy Delgado attended a feedback session today.  We discussed the results of the neuropsychological evaluation and I gave time to discuss questions and concerns. For full evaluation details, please see the note from this provider dated 2/16/2023. A copy of the report was mailed to them today.     Problem List Items Addressed This Visit          Psychiatric    Adjustment disorder with mixed anxiety and depressed mood     Other Visit Diagnoses       Memory deficit    -  Primary              Tiny Bull, PhD  Licensed Clinical Neuropsychologist  [facility] - Department of Neurology                     This service was not originating from a related E/M service provided within the previous 7 days nor will  to an E/M service or procedure within the next 24 hours or my soonest available appointment.  Prevailing standard of care was able to be met in this audio-only visit.

## 2023-03-23 ENCOUNTER — TELEPHONE (OUTPATIENT)
Dept: NEUROSURGERY | Facility: CLINIC | Age: 68
End: 2023-03-23
Payer: MEDICARE

## 2023-03-23 NOTE — TELEPHONE ENCOUNTER
Called pt. Offered appt. Accepted date/time/place. Mailed    ----- Message from Isabella Thornton sent at 3/22/2023 10:54 AM CDT -----  Contact: pt @ [phone]  Kassy Delgado calling regarding Patient Advice (message) for #pt is calling to get MRI results that was taking on 12/6, asking for call

## 2023-05-03 ENCOUNTER — TELEPHONE (OUTPATIENT)
Dept: NEUROLOGY | Facility: CLINIC | Age: 68
End: 2023-05-03
Payer: MEDICARE

## 2023-05-03 NOTE — TELEPHONE ENCOUNTER
----- Message from Huber Ordonez sent at 5/3/2023 10:04 AM CDT -----  Type:  Patient Returning Call    Who Called:spouse  Does the patient know what this is regarding?:pt has been driving around the parking lot for awhile and can not find a spot   Would the patient rather a call back or a response via [facility]? Call   Best Call Back Number:[phone]  Additional Information:

## 2023-05-03 NOTE — TELEPHONE ENCOUNTER
Attempted to call the patient to reschedule appointment due to patient exceeding 15 minute reyes period. LVM to return the call

## 2023-05-23 ENCOUNTER — OFFICE VISIT (OUTPATIENT)
Dept: NEUROSURGERY | Facility: CLINIC | Age: 68
End: 2023-05-23
Payer: MEDICARE

## 2023-05-23 VITALS — SYSTOLIC BLOOD PRESSURE: 127 MMHG | HEART RATE: 81 BPM | DIASTOLIC BLOOD PRESSURE: 61 MMHG

## 2023-05-23 DIAGNOSIS — G62.9 NEUROPATHY: ICD-10-CM

## 2023-05-23 DIAGNOSIS — D33.3 ACOUSTIC NEUROMA: Primary | ICD-10-CM

## 2023-05-23 DIAGNOSIS — R13.19 DYSPHAGIA, NEUROLOGIC: ICD-10-CM

## 2023-05-23 DIAGNOSIS — I67.83 PRES (POSTERIOR REVERSIBLE ENCEPHALOPATHY SYNDROME): ICD-10-CM

## 2023-05-23 PROCEDURE — 1126F PR PAIN SEVERITY QUANTIFIED, NO PAIN PRESENT: ICD-10-PCS | Mod: CPTII,S$GLB,, | Performed by: PHYSICIAN ASSISTANT

## 2023-05-23 PROCEDURE — 4010F PR ACE/ARB THEARPY RXD/TAKEN: ICD-10-PCS | Mod: CPTII,S$GLB,, | Performed by: PHYSICIAN ASSISTANT

## 2023-05-23 PROCEDURE — 3074F SYST BP LT 130 MM HG: CPT | Mod: CPTII,S$GLB,, | Performed by: PHYSICIAN ASSISTANT

## 2023-05-23 PROCEDURE — 3288F PR FALLS RISK ASSESSMENT DOCUMENTED: ICD-10-PCS | Mod: CPTII,S$GLB,, | Performed by: PHYSICIAN ASSISTANT

## 2023-05-23 PROCEDURE — 3078F DIAST BP <80 MM HG: CPT | Mod: CPTII,S$GLB,, | Performed by: PHYSICIAN ASSISTANT

## 2023-05-23 PROCEDURE — 99999 PR PBB SHADOW E&M-EST. PATIENT-LVL III: CPT | Mod: PBBFAC,,, | Performed by: PHYSICIAN ASSISTANT

## 2023-05-23 PROCEDURE — 3044F PR MOST RECENT HEMOGLOBIN A1C LEVEL <7.0%: ICD-10-PCS | Mod: CPTII,S$GLB,, | Performed by: PHYSICIAN ASSISTANT

## 2023-05-23 PROCEDURE — 3044F HG A1C LEVEL LT 7.0%: CPT | Mod: CPTII,S$GLB,, | Performed by: PHYSICIAN ASSISTANT

## 2023-05-23 PROCEDURE — 3078F PR MOST RECENT DIASTOLIC BLOOD PRESSURE < 80 MM HG: ICD-10-PCS | Mod: CPTII,S$GLB,, | Performed by: PHYSICIAN ASSISTANT

## 2023-05-23 PROCEDURE — 1126F AMNT PAIN NOTED NONE PRSNT: CPT | Mod: CPTII,S$GLB,, | Performed by: PHYSICIAN ASSISTANT

## 2023-05-23 PROCEDURE — 99417 PROLNG OP E/M EACH 15 MIN: CPT | Mod: S$GLB,,, | Performed by: PHYSICIAN ASSISTANT

## 2023-05-23 PROCEDURE — 3074F PR MOST RECENT SYSTOLIC BLOOD PRESSURE < 130 MM HG: ICD-10-PCS | Mod: CPTII,S$GLB,, | Performed by: PHYSICIAN ASSISTANT

## 2023-05-23 PROCEDURE — 99215 PR OFFICE/OUTPT VISIT, EST, LEVL V, 40-54 MIN: ICD-10-PCS | Mod: S$GLB,,, | Performed by: PHYSICIAN ASSISTANT

## 2023-05-23 PROCEDURE — 3288F FALL RISK ASSESSMENT DOCD: CPT | Mod: CPTII,S$GLB,, | Performed by: PHYSICIAN ASSISTANT

## 2023-05-23 PROCEDURE — 1159F MED LIST DOCD IN RCRD: CPT | Mod: CPTII,S$GLB,, | Performed by: PHYSICIAN ASSISTANT

## 2023-05-23 PROCEDURE — 1101F PT FALLS ASSESS-DOCD LE1/YR: CPT | Mod: CPTII,S$GLB,, | Performed by: PHYSICIAN ASSISTANT

## 2023-05-23 PROCEDURE — 99417 PR PROLONGED SVC, OUTPT, W/WO DIRECT PT CONTACT,  EA ADDTL 15 MIN: ICD-10-PCS | Mod: S$GLB,,, | Performed by: PHYSICIAN ASSISTANT

## 2023-05-23 PROCEDURE — 1159F PR MEDICATION LIST DOCUMENTED IN MEDICAL RECORD: ICD-10-PCS | Mod: CPTII,S$GLB,, | Performed by: PHYSICIAN ASSISTANT

## 2023-05-23 PROCEDURE — 1101F PR PT FALLS ASSESS DOC 0-1 FALLS W/OUT INJ PAST YR: ICD-10-PCS | Mod: CPTII,S$GLB,, | Performed by: PHYSICIAN ASSISTANT

## 2023-05-23 PROCEDURE — 99215 OFFICE O/P EST HI 40 MIN: CPT | Mod: S$GLB,,, | Performed by: PHYSICIAN ASSISTANT

## 2023-05-23 PROCEDURE — 4010F ACE/ARB THERAPY RXD/TAKEN: CPT | Mod: CPTII,S$GLB,, | Performed by: PHYSICIAN ASSISTANT

## 2023-05-23 PROCEDURE — 99999 PR PBB SHADOW E&M-EST. PATIENT-LVL III: ICD-10-PCS | Mod: PBBFAC,,, | Performed by: PHYSICIAN ASSISTANT

## 2023-05-23 RX ORDER — GABAPENTIN 300 MG/1
300 CAPSULE ORAL 2 TIMES DAILY
Qty: 60 CAPSULE | Refills: 11 | Status: SHIPPED | OUTPATIENT
Start: 2023-05-23 | End: 2024-05-22

## 2023-05-23 NOTE — PROGRESS NOTES
Neurosurgery  Established Patient    SUBJECTIVE:     History of Present Illness:  Kassy Delgado is a 68 y.o. female with history of acoustic neuroma s/p GSRS (7/14/21 with Dr. 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 Patient also has a history of previous C5-C7 ACDF ([facility] 2000), COPD, and PRES (2016). Patient presents today for follow up and to review cervical MRI ordered by Dr. Ramos at her last clinic appointment on 11/15/22 when she complaints of R>L hand weakness and gait instability. She reports continue dysphagia mostly caused by numbness and tingling around and in mouth. Reports numbness and tingling throughout the left side of her body that is extremely bothersome. She is following with Dr. Ferguson and with neuro psych. Neuro psych evaluation with suspicion of adjustment disorder with anxiety and depression and recommended psych referral and SSRI. Patient is not interested at this time.       Review of patient's allergies indicates:   Allergen Reactions    Dicloxacillin Shortness Of Breath    Apazone Other (See Comments)    Iodine Rash       Current Outpatient Medications   Medication Sig Dispense Refill    albuterol (PROVENTIL/VENTOLIN HFA) 90 mcg/actuation inhaler Inhale 2 puffs into the lungs every 4 (four) hours as needed.      losartan (COZAAR) 100 MG tablet Take 100 mg by mouth.       No current facility-administered medications for this visit.       Past Medical History:   Diagnosis Date    Acoustic neuroma     Arthritis      Past Surgical History:   Procedure Laterality Date    bilateral shoulder arthroplasty      HYSTERECTOMY      REWIRING OF STERNUM      right hip arthroplasty      right knee arthroplasty       Family History    None       Social History     Socioeconomic History    Marital status:    Tobacco Use    Smoking status: Never       Review of Systems   Constitutional:  Positive for  activity change. Negative for chills, fatigue and fever.   HENT:  Positive for trouble swallowing.    Eyes:  Negative for photophobia and visual disturbance.   Respiratory:  Negative for cough and shortness of breath.    Cardiovascular:  Negative for chest pain, palpitations and leg swelling.   Gastrointestinal:  Negative for constipation, diarrhea, nausea and vomiting.   Genitourinary:  Negative for difficulty urinating, dysuria, frequency and urgency.   Musculoskeletal:  Positive for gait problem and myalgias. Negative for back pain and neck pain.   Neurological:  Positive for speech difficulty, weakness and numbness. Negative for dizziness, seizures and headaches.   Psychiatric/Behavioral:  Negative for confusion. The patient is not nervous/anxious.      OBJECTIVE:     Vital Signs     There is no height or weight on file to calculate BMI.    Neurosurgery Physical Exam  General: well developed, well nourished, no distress.   Head: normocephalic, atraumatic  Neurologic: Alert and oriented. Thought content appropriate.  Face symmetric, left hearing impaired   Eyes: pupils equal, round, reactive to light with accommodation, EOMI.   Pulmonary: normal respirations, no signs of respiratory distress  Skin: Skin is warm, dry and intact.  Sensory: subjective abnormal sensation through left side  Motor Strength: mild left sided weakness, 3/5 throughout      Diagnostic Results:  I have personally reviewed imaging and agree with the findings    MRI cervical spine wo contrast (12/6/22):   1. Prior ACDF from C5-C7.  2. Grade 1 anterolisthesis of C7 on T1.  3. Degenerative disc disease at C4-C5 resulting in mild central spinal canal stenosis with moderate to severe neural foraminal narrowing.  4. Multilevel degenerative disc disease from C2 through C4 and from C5 through T1 resulting in mild to moderate neural foraminal narrowing.    ASSESSMENT/PLAN:     67 y/o with history of acoustic neuroma s/p GSRS (7/14/21 with Dr. Ramos)  with residual left sensorineural hearing loss, dizziness, and dysphagia 2/2 left CN VIII compression from acoustic neuroma. Patient also has a history of previous C5-C7 ACDF ([facility] 2000), COPD, and PRES (2016).    -MRI cervical spine with stable C5-C7 ACDF, no cord compression or myelomalacia.    -Recommend SLP evaluation and treatment for dysphagia. Patient currently eating soft/pureed diet.   -Patient would like to try gabapentin for neuropathy. Okay with prescribing a trial of 300 mg BID, working up to TID as tolerated. Patient to discuss continuation and further management of this medication with her PCP.   -Referral to [place] neurology for evaluation of neuropathy  I would like the patient to follow-up in clinic PRN for cervical spine and as scheduled with Dr. Ramos for acoustic neuroma. I have encouraged her to contact the clinic with any questions, concerns, or adverse clinical changes. She verbalized understanding.       Nury Sy PA-C  Neurosurgery       Time spent on this encounter: 90 minutes. This includes face-to-face time and non-face to face time preparing to see the patient (eg, review of tests), obtaining and/or reviewing separately obtained history, documenting clinical information in the electronic or other health record, independently interpreting results and communicating results to the patient/family/caregiver, or care coordinator     Note dictated with voice recognition software, please excuse any grammatical errors.

## 2023-05-31 ENCOUNTER — CLINICAL SUPPORT (OUTPATIENT)
Dept: REHABILITATION | Facility: HOSPITAL | Age: 68
End: 2023-05-31
Payer: MEDICARE

## 2023-05-31 DIAGNOSIS — R41.841 COGNITIVE COMMUNICATION DISORDER: ICD-10-CM

## 2023-05-31 DIAGNOSIS — D33.3 ACOUSTIC NEUROMA: ICD-10-CM

## 2023-05-31 DIAGNOSIS — R13.10 DYSPHAGIA, UNSPECIFIED TYPE: ICD-10-CM

## 2023-05-31 DIAGNOSIS — R13.19 DYSPHAGIA, NEUROLOGIC: ICD-10-CM

## 2023-05-31 PROCEDURE — 92610 EVALUATE SWALLOWING FUNCTION: CPT | Mod: PN
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
## 2023-06-12 ENCOUNTER — CLINICAL SUPPORT (OUTPATIENT)
Dept: REHABILITATION | Facility: HOSPITAL | Age: 68
End: 2023-06-12
Payer: MEDICARE

## 2023-06-12 DIAGNOSIS — R13.10 DYSPHAGIA, UNSPECIFIED TYPE: Primary | ICD-10-CM

## 2023-06-12 PROCEDURE — 92526 ORAL FUNCTION THERAPY: CPT | Mod: PN

## 2023-06-12 NOTE — PROGRESS NOTES
"[facility]  Speech Therapy Treatment Note- Neurological Rehabilitation and Dysphagia  Date: 6/12/2023     Name: Kassy Delgado   MRN: [id number]   Therapy Diagnosis:   Encounter Diagnosis   Name Primary?    Dysphagia, unspecified type Yes   Physician: Nury Sy PA-C  Physician Orders: Ambulatory Referral to Speech Therapy   Medical Diagnosis:   D33.3 (ICD-10-CM) - Acoustic neuroma   R13.19 (ICD-10-CM) - Dysphagia, neurologic     Visit #/ Visits Authorized: 1/12  Date of Evaluation:  5/31/2023  Insurance Authorization Period: 12/31/2023  Plan of Care Expiration Date: 7/26/2023  Extended Plan of Care:  n/a   Progress Note: 6/28/2023     Time In:  12:00 PM  Time Out:  12:45 PM  Total Billable Time: 45 minutes     Precautions: Standard  Subjective:   Patient reports:     Dr. Yu is her PCP.  Dr. Todd Simmons is her ENT in [place].    Patient states she "eats a little and takes breaks to breath and relax."    "Everyday is harder to speak."    "Deteriorating ability to control body."    Patient does meditated breathing.     She was compliant to home exercise program.   Response to previous treatment: first treatment session  Pain Scale: 7/10 on a Visual Analog Scale currently.  Pain Location: Dizziness  Objective:   TIMED  Procedure Min.   Cognitive Therapeutic Interventions, first 15 minutes CPT 99486  -   Cognitive Therapeutic Interventions, each additional 15 minutes CPT 83955  -         UNTIMED  Procedure   Dysphagia Therapy     Short Term Goals: (4 weeks) Current Progress:   1. Patient will complete Modified Barium Swallow Study (MBSS) for further assessment of swallow function to determine further speech therapy Plan of Care.    Progressing/ Not Met 6/12/2023   Clinician has sent x2 Epic messages to the referring provider, Nury Sy. Waiting for referral.    2. Patient will demonstrate use of safe swallow strategies and reflux management.      Progressing/ Not Met " 2023   Education provided.     3. Patient will complete straw sip against resistance x30 given minimal cues to increase labial strength and lingual protraction.       Progressing/ Not Met 6/12/2023   X20 with applesauce given minimal cueing.     4. Patient will use diaphragmatic breathing with a sustained phonation of 8-10 seconds in 5/5 trials for improved respiratory-swallow coordination.       Progressing/ Not Met 2023   Patient requiring minimal to moderate cues to facilitate diaphragmatic breathing. Sustained phonation 1-2 seconds.     5. Patient will complete selective attention tasks (auditory or visual) with 90% accuracy independently increase selective attention.      Progressing/ Not Met 2023   -Not addressed this session.      6. Patient will sustain attention to complete moderate to complex reasoning tasks for 2 minutes with one request for clarification to increase sustained attention      Progressing/ Not Met 2023   -Not addressed this session.      7. Patient will complete short term recall tasks after a 30 minute delay with 90% accuracy  independently  with use of memory strategies to improve recall of information and generalization of memory strategies.      Progressing/ Not Met 2023   -Not addressed this session.        Patient Education/Response:   Patient educated regarding the followin. Safe swallow strategies   2. Straw sip against resistance  3. Diaphragmatic breathing   4. Reflux management    Home program established: yes-Provided handouts: safe swallow precautions, reflux management. Encouraged to work on diaphragmatic breathing and straw sip against resistance x30 each day.  Patient verbalized understanding to all above education provided.     See Electronic Medical Record under Patient Instructions for exercises provided throughout therapy.  Assessment:       Kassy is progressing well towards her goals. Current goals remain appropriate. Goals to be updated as  necessary.     Patient prognosis is Fair. Patient will continue to benefit from skilled outpatient speech and language therapy to address the deficits listed in the problem list on initial evaluation, provide patient/family education and to maximize patient's level of independence in the home and community environment.   Medical necessity is demonstrated by the following IMPAIRMENTS:  Cognition: Deficits in attention and memory prevent the pt from relaying medically and safety relevant information in a timely manner in a state of emergency.   Dysphagia: Impaired swallow physiology results in decreased efficiency and effectiveness of oral intake.    Barriers to Therapy: Anxiety   Patient's spiritual, cultural and educational needs considered and patient agreeable to plan of care and goals.  Plan:   Continue Plan of Care 1x/week with focus on rehabilitation and compensation for swallowing and cognitive deficits.     Aishwarya Mallory, HUMPHREY-SLP   6/12/2023

## 2023-06-14 ENCOUNTER — TELEPHONE (OUTPATIENT)
Dept: NEUROSURGERY | Facility: CLINIC | Age: 68
End: 2023-06-14
Payer: MEDICARE

## 2023-06-14 ENCOUNTER — DOCUMENTATION ONLY (OUTPATIENT)
Dept: REHABILITATION | Facility: HOSPITAL | Age: 68
End: 2023-06-14
Payer: MEDICARE

## 2023-06-14 DIAGNOSIS — R47.02 DYSPHASIA: ICD-10-CM

## 2023-06-14 DIAGNOSIS — R13.19 DYSPHAGIA, NEUROLOGIC: Primary | ICD-10-CM

## 2023-06-14 NOTE — PROGRESS NOTES
"Clinician has messaged Nury Sy PA-C about recommendation for an MBSS referral. Provider messaged back both times, "I agree, thank you." Waiting for the referral to be placed in order to schedule.       Aishwarya Mallory M.S. CCC-SLP       "

## 2023-06-14 NOTE — TELEPHONE ENCOUNTER
----- Message from COLLIN Ross sent at 6/14/2023  3:42 PM CDT -----  Great! Please let me know when you are able to drop the referral so we can get that scheduled.    Thank You!    ----- Message -----  From: Nury Sy PA-C  Sent: 6/14/2023   1:31 PM CDT  To: COLLIN Ross    I agree thank you!  ----- Message -----  From: COLLIN oRss  Sent: 6/13/2023   9:58 AM CDT  To: SHANDA Stone,    Following up about a patient you referred, Kassy Delgado. I completed her swallow evaluation two weeks ago and am recommending a modified barium swallow study (MBSS). Let me know if you agree and can place a referral.       Thank You!    SARAH Ross

## 2023-06-21 ENCOUNTER — CLINICAL SUPPORT (OUTPATIENT)
Dept: REHABILITATION | Facility: HOSPITAL | Age: 68
End: 2023-06-21
Payer: MEDICARE

## 2023-06-21 DIAGNOSIS — R13.10 DYSPHAGIA, UNSPECIFIED TYPE: Primary | ICD-10-CM

## 2023-06-21 PROCEDURE — 97129 THER IVNTJ 1ST 15 MIN: CPT | Mod: PN

## 2023-06-21 PROCEDURE — 97130 THER IVNTJ EA ADDL 15 MIN: CPT | Mod: PN

## 2023-06-21 NOTE — PROGRESS NOTES
[facility]  Speech Therapy Treatment Note- Neurological Rehabilitation and Dysphagia  Date: 6/21/2023     Name: Kassy Delgado   MRN: [id number]   Therapy Diagnosis:   Encounter Diagnosis   Name Primary?    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 Patient is starting to eat soft foods and not just puree.     Patient reports her mind is always racing and that is very distracting.     She was compliant to home exercise program.   Response to previous treatment: first treatment session  Pain Scale: 7/10 on a Visual Analog Scale currently.  Pain Location: Dizziness  Objective:   TIMED  Procedure Min.   Cognitive Therapeutic Interventions, first 15 minutes CPT 20263  15   Cognitive Therapeutic Interventions, each additional 15 minutes CPT 24075  30         UNTIMED  Procedure   Dysphagia Therapy     Timed: 3/3    Short Term Goals: (4 weeks) Current Progress:   1. Patient will complete Modified Barium Swallow Study (MBSS) for further assessment of swallow function to determine further speech therapy Plan of Care.    Progressing/ Not Met 6/21/2023   Patient scheduled for MBSS on 7/12. Confirmed with patient and family today.    2. Patient will demonstrate use of safe swallow strategies and reflux management.            Progressing/ Not Met 6/21/2023   Patient  independently identifying swallowing strategies: decreasing distractions, small bites and sips, alternating liquids and solids, and eating a softer diet.    Met 1/3   3. Patient will complete straw sip against resistance x30 given minimal cues to increase labial strength and lingual protraction.       Progressing/ Not Met 2023   Patient does straw sip against resistance every day with yogurt.    4. Patient will use diaphragmatic breathing with a sustained phonation of 8-10 seconds in 5/5 trials for improved respiratory-swallow coordination.       Progressing/ Not Met 2023   Patient requiring minimal to moderate cues to facilitate diaphragmatic breathing.   Sustained phonation:  Independently = 2 seconds  In unison with clinician = 6-7 seconds    5. Patient will complete selective attention tasks (auditory or visual) with 90% accuracy independently increase selective attention.      Progressing/ Not Met 2023   -Not addressed this session.      6. Patient will sustain attention to complete moderate to complex reasoning tasks for 2 minutes with one request for clarification to increase sustained attention      Progressing/ Not Met 2023   Provided education and discussed compensatory strategies.       7. Patient will complete short term recall tasks after a 30 minute delay with 90% accuracy  independently  with use of memory strategies to improve recall of information and generalization of memory strategies.      Progressing/ Not Met 2023   Provided education on memory compensatory strategies.         Patient Education/Response:   Patient educated regarding the followin. Safe swallow strategies   2. Straw sip against resistance  3. Diaphragmatic breathing   4. Attention strategies  5. Memory strategies    Home program established: yes-Provided handouts: diaphragmatic breathing, attention strategies, and memory strategies. Encouraged patient to attempt to try to form a visual picture in her  head or doodle/color a coloring page when listening to a story to help with focus.  Patient verbalized understanding to all above education provided.     See Electronic Medical Record under Patient Instructions for exercises provided throughout therapy.  Assessment:   Patient with good memory recall or breath support and coordination as well as swallowing strategies discussed in the previous session. Patient often distracted by her internal dialogue which is greatly impacting her attention. Patient to start trying to use doodling or coloring a coloring page to help with eliminating the monotony of simply listening and to quiet her internal dialogue. Patient with good understanding of memory strategies, however attention seems to be impacting memory.  Kassy is progressing well towards her goals. Current goals remain appropriate. Goals to be updated as necessary.     Patient prognosis is Fair. Patient will continue to benefit from skilled outpatient speech and language therapy to address the deficits listed in the problem list on initial evaluation, provide patient/family education and to maximize patient's level of independence in the home and community environment.   Medical necessity is demonstrated by the following IMPAIRMENTS:  Cognition: Deficits in attention and memory prevent the pt from relaying medically and safety relevant information in a timely manner in a state of emergency.   Dysphagia: Impaired swallow physiology results in decreased efficiency and effectiveness of oral intake.    Barriers to Therapy: Anxiety   Patient's spiritual, cultural and educational needs considered and patient agreeable to plan of care and goals.  Plan:   Continue Plan of Care 1x/week with focus on rehabilitation and compensation for swallowing and cognitive deficits.     Aishwarya Mallory, CCC-SLP   6/21/2023

## 2023-06-28 ENCOUNTER — CLINICAL SUPPORT (OUTPATIENT)
Dept: REHABILITATION | Facility: HOSPITAL | Age: 68
End: 2023-06-28
Payer: MEDICARE

## 2023-06-28 DIAGNOSIS — R41.841 COGNITIVE COMMUNICATION DISORDER: ICD-10-CM

## 2023-06-28 DIAGNOSIS — R13.10 DYSPHAGIA, UNSPECIFIED TYPE: Primary | ICD-10-CM

## 2023-06-28 PROCEDURE — 97129 THER IVNTJ 1ST 15 MIN: CPT | Mod: PN

## 2023-06-28 PROCEDURE — 97130 THER IVNTJ EA ADDL 15 MIN: CPT | Mod: PN

## 2023-06-28 NOTE — PROGRESS NOTES
"[facility]  Speech Therapy Treatment Note- Neurological Rehabilitation and Dysphagia  Date: 6/28/2023     Name: Kassy Delgado   MRN: [id number]   Therapy Diagnosis:   Encounter Diagnoses   Name Primary?    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 "I am in fight or flight."     She was compliant to home exercise program.   Response to previous treatment: first treatment session  Pain Scale: 7/10 on a Visual Analog Scale currently.  Pain Location: Dizziness  Objective:   TIMED  Procedure Min.   Cognitive Therapeutic Interventions, first 15 minutes CPT 16552  15   Cognitive Therapeutic Interventions, each additional 15 minutes CPT 73377  30         UNTIMED  Procedure   Dysphagia Therapy     Timed: 3/3    Short Term Goals: (4 weeks) Current Progress:   1. Patient will complete Modified Barium Swallow Study (MBSS) for further assessment of swallow function to determine further speech therapy Plan of Care.    Progressing/ Not Met 6/28/2023   Patient scheduled for MBSS on 7/12. Confirmed with patient and family.    2. Patient will demonstrate use of safe swallow strategies and reflux management.        Met 6/29/2023 Patient independently identifying swallowing strategies: decreasing distractions, small bites and sips, alternating liquids and solids, and eating a softer diet.   3. " Patient will complete straw sip against resistance x30 given minimal cues to increase labial strength and lingual protraction.       Progressing/ Not Met 6/28/2023   Patient does straw sip against resistance every day with a puree.    4. Patient will use diaphragmatic breathing with a sustained phonation of 8-10 seconds in 5/5 trials for improved respiratory-swallow coordination.       Progressing/ Not Met 6/28/2023   Patient requiring minimal to moderate cues to facilitate diaphragmatic breathing.   Sustained phonation:  Independently = 3 seconds  In unison with clinician = 8 seconds    Counted on one breath for 9 seconds.      Unable to hold her breath for more than a second or two. Unable to do 4-7-8 breathing.    5. Patient will complete selective attention tasks (auditory or visual) with 90% accuracy independently increase selective attention.      Progressing/ Not Met 6/28/2023   Attempted to have patient complete an alternating attention task with background noise = patient unable to start task as this was causing anxiety.       6. Patient will sustain attention to complete moderate to complex reasoning tasks for 2 minutes with one request for clarification to increase sustained attention      Progressing/ Not Met 6/28/2023   Provided education and discussed compensatory strategies. Patient reports good understanding of compensatory strategies.       7. Patient will complete short term recall tasks after a 30 minute delay with 90% accuracy  independently  with use of memory strategies to improve recall of information and generalization of memory strategies.      Progressing/ Not Met 6/28/2023   Patient reports implementing memory strategies.     Patient participated in a chaining word list task memorizing words in each list in order associating each word directly to the one below: Patient independently associated word lists in 75% of opportunities independently. Patient able to recall 3-5 word lists  immediately after associating words. Difficulties noted with recalling 6-8 word list.      Patient Education/Response:   Patient educated regarding the followin. Safe swallow strategies   2. Straw sip against resistance  3. Diaphragmatic breathing   4. Attention strategies  5. Memory strategies    Home program established: yes-Encouraged patient to attempt to try to form a visual picture in her head or doodle/color a coloring page when listening to a story to help with focus.  Patient verbalized understanding to all above education provided.     See Electronic Medical Record under Patient Instructions for exercises provided throughout therapy.  Assessment:   Patient with good memory recall or breath support and coordination as well as swallowing strategies discussed in the previous session. Patient's primary cognitive deficit is limited attention. Therapy focused primarily on the use of compensatory strategies to improve attention. Limited ability to complete attention exercises to improve attention as this elicited anxiety and worsened patient's ability to think clearly. Patient with good understanding of memory strategies, however attention seems to be impacting memory. Therapy will likely not be able to target attention as patient is unable to participate in exercises to improve attention. Recommend cancelling next session to wait for patient to receive her MBSS. Therapy will primarily focus on swallowing deficits.   Kassy is progressing well towards her goals. Current goals remain appropriate. Goals to be updated as necessary.     Patient prognosis is Fair. Patient will continue to benefit from skilled outpatient speech and language therapy to address the deficits listed in the problem list on initial evaluation, provide patient/family education and to maximize patient's level of independence in the home and community environment.   Medical necessity is demonstrated by the following IMPAIRMENTS:  Cognition:  Deficits in attention and memory prevent the pt from relaying medically and safety relevant information in a timely manner in a state of emergency.   Dysphagia: Impaired swallow physiology results in decreased efficiency and effectiveness of oral intake.    Barriers to Therapy: Anxiety   Patient's spiritual, cultural and educational needs considered and patient agreeable to plan of care and goals.  Plan:   Continue Plan of Care 1x/week with focus on rehabilitation and compensation for swallowing and cognitive deficits.     Aishwarya Mallory CCC-SLP   6/28/2023

## 2023-07-12 ENCOUNTER — DOCUMENTATION ONLY (OUTPATIENT)
Dept: REHABILITATION | Facility: HOSPITAL | Age: 68
End: 2023-07-12

## 2023-07-12 ENCOUNTER — CLINICAL SUPPORT (OUTPATIENT)
Dept: REHABILITATION | Facility: HOSPITAL | Age: 68
End: 2023-07-12
Attending: PHYSICIAN ASSISTANT
Payer: MEDICARE

## 2023-07-12 ENCOUNTER — HOSPITAL ENCOUNTER (OUTPATIENT)
Dept: RADIOLOGY | Facility: HOSPITAL | Age: 68
Discharge: HOME | End: 2023-07-12
Attending: PHYSICIAN ASSISTANT
Payer: MEDICARE

## 2023-07-12 DIAGNOSIS — R13.12 OROPHARYNGEAL DYSPHAGIA: ICD-10-CM

## 2023-07-12 DIAGNOSIS — R13.12 OROPHARYNGEAL DYSPHAGIA: Primary | ICD-10-CM

## 2023-07-12 PROBLEM — R13.10 DYSPHAGIA: Status: RESOLVED | Noted: 2023-05-31 | Resolved: 2023-07-12

## 2023-07-12 PROCEDURE — 92610 EVALUATE SWALLOWING FUNCTION: CPT | Mod: PN

## 2023-07-12 PROCEDURE — 74230 X-RAY XM SWLNG FUNCJ C+: CPT | Mod: 26,,, | Performed by: RADIOLOGY

## 2023-07-12 PROCEDURE — 74230 FL MODIFIED BARIUM SWALLOW SPEECH STUDY: ICD-10-PCS | Mod: 26,,, | Performed by: RADIOLOGY

## 2023-07-12 PROCEDURE — 74230 X-RAY XM SWLNG FUNCJ C+: CPT | Mod: TC

## 2023-07-12 PROCEDURE — 92611 MOTION FLUOROSCOPY/SWALLOW: CPT | Mod: PN

## 2023-07-12 NOTE — PROGRESS NOTES
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 Patient with good understanding of memory strategies, however attention seems to be impacting memory. Patient's primary cognitive deficit is limited attention. Therapy focused primarily on the use of compensatory strategies to improve attention. Limited ability to complete attention exercises to improve attention as this elicited anxiety and worsened patient's ability to think clearly.     MBSS 7/12/2023: Patient presents with oral and pharyngeal phase of swallow within normal limits. Thus dysphagia therapy is not recommended at this time.     Goals:   1. Patient will complete Modified Barium Swallow Study (MBSS) for further assessment of swallow function to determine further speech therapy Plan of Care. - Met  2. Patient will demonstrate use of safe swallow strategies and reflux management. - Met  3. Patient will complete straw sip against resistance x30 given minimal cues to increase labial strength and lingual protraction.   - Met  4. Patient will use diaphragmatic breathing with a sustained phonation of 8-10 seconds in 5/5 trials for improved respiratory-swallow coordination. - progressing/not met  5. Patient will complete selective attention tasks (auditory or visual) with 90% accuracy independently  increase selective attention.  - progressing/not met  6. Patient will sustain attention to complete moderate to complex reasoning tasks for 2 minutes with one request for clarification to increase sustained attention   - progressing/not met  7. Patient will complete short term recall tasks after a 30 minute delay with 90% accuracy  independently  with use of memory strategies to improve recall of information and generalization of memory strategies.   - progressing/not met    Long Term Goals:  Patient will tolerate least restrictive diet without s/s of aspiration as shown on repeat MBSS. - Met  Patient will improve cognitive skills to effectively attend to and communicate in complex daily living tasks in functional living environment. - progressing/not met     Discharge reason: Patient scored within the normal limits for oral and pharyngeal swallow function on repeat MBSS so dysphagia therapy is not recommended. Patient following swallowing and reflux precautions. Patient unable to work on attention exercises as this worsens anxiety.     Plan   This patient is discharged from Speech Therapy    Aishwarya Mallory CCC-SLP   7/12/2023

## 2023-07-12 NOTE — PLAN OF CARE
[facility]  MODIFIED BARIUM SWALLOW STUDY      Date: 7/12/2023     Name: Kassy Delgado   MRN: [id number]    Therapy Diagnosis: WFL oral and pharyngeal phases of the swallow    Physician: Nury Sy PA-C  Physician Orders: SLP Video Swallow  Medical Diagnosis from Referral: Oropharyngeal dysphagia [R13.12]    Date of Evaluation:  7/12/2023     Time In:  1110  Time Out:  1140  Total Billable Time: 30     Procedure Min.   Swallow and Oral Function Evaluation   15   Fl Modified Barium Swallow Speech  15     Precautions: Standard and Fall    Subjective   Date of Onset: 6 years ago    Past Medical History: Kassy Delagdo  has a past medical history of Acoustic neuroma and Arthritis.  Kassy Delgado  has a past surgical history that includes bilateral shoulder arthroplasty; right hip arthroplasty; right knee arthroplasty; Rewiring of sternum; and Hysterectomy.    The patient is a 68 y.o. female who complains of  numbness on left side of face .      -Current diet at home: pureed/nectar thick liquids  -Recommended diet from previous study: unable to obtain report in patient chart and patient unable to recall recommendations from previous Modified Barium Swallow Study (MBSS). Patient reports MBS completed last year at [facility] in [place], [place]  -Therapy received: n/a    In consideration of the interrelationships between body systems and swallowing, History was provided by patient, her , and/or taken from chart review. The following are medical conditions present in the patient's history which can result in or be attributed to dysphagia:  Respiratory None noted in this category   Cardiovascular None noted in this category   Digestive dysphagia   Infections  None noted in this category   Urinary None noted in this category   Endocrine None noted in this category   Nervous Cognitive communication disorder   Skeletal None noted in this  "category   Immune None noted in this category   Cancer None noted in this category   Psychiatric  None noted in this category   Congenital  None noted in this category   Other None noted in this category     The following observations were made:   -Mental status: Alert and Cooperative  -Factors affecting performance: no difficulties participating in the study  -Feeding Method: independent in self-feeding    Respiratory Status:   -Respiratory Status: room air    Medical Hx and Allergies:    Review of patient's allergies indicates:   Allergen Reactions    Dicloxacillin Shortness Of Breath    Apazone Other (See Comments)    Iodine Rash       Pain Scale:  0/10 on VAS currently.   Pain Location: no pain indicated across study    Objective     Modified Barium Swallow Study  Purpose: to evaluate anatomy and physiology of the oropharyngeal swallow, to determine effectiveness of rehabilitation strategies, and to determine diet consistency and intervention recommendations. The study was performed using the "Gold Standard" of 30 fps with as low as reasonably achievable (ALARA) exposure.     The patient was seen in radiology seated in High Finn's position in a video imaging chair for lateral views of the larynx and an A/P view. The study was conducted using Varibar thin liquid (IDDSI 0), Varibar nectar liquid (IDDSI 2), Varibar pudding (IDDSI 4), Peaches covered in Varibar powder (IDDSI 6/2), and solid coated in Varibar pudding (IDDSI 7). She tolerated the procedure well.     A cranial nerve examination revealed the following:  Cranial Nerve Examination  Cranial Nerve 5: Trigeminal Nerve  Motor Jaw Posture at rest: Closed  Mandible Elevation/Depression: WFL  Mandible lateralization: WFL  Abnormal movement: absent Interpretation: Intact bilaterally    Sensory Forehead: WFL  Cheek: WFL  Jaw: WFL  Facial Pain: None noted Interpretation: Intact bilaterally      Cranial Nerve 7: Facial Nerve  Motor Facial Symmetry: WNL  Wrinkle " Forehead: WFL  Close eyes tightly: WFL  Labial Protrusion: WFL  Labial Retraction: WFL  Buccal Strength with Labial Seal: WFL  Abnormal movement: absent Interpretation: Intact bilaterally    Sensory Formal testing not completed.       Cranial Nerves IX and X: Glossopharyngeal and Vagus Nerves  Motor Palatal Symmetry (Rest): WNL  Palatal Symmetry (Movement): WNL  Cough: Perceptually strong  Voice Prior to PO intake: Clear  Resonance: Normal  Abnormal movement: absent Interpretation: Intact bilaterally      Cranial Nerve XII: Hypoglossal Nerve  Motor Tongue at rest: WNL  Lingual Protrusion: WNL  Lingual Protrusion against Resistance: WNL  Lingual Lateralization: WNL  Abnormal movement: absent Interpretation: Intact bilaterally      Other information:  Volitional Swallow: Able to palpate laryngeal rise  Mucosal Quality: No abnormal findings  Secretion Management: no overt deficits noted/observed  Dentition: Good condition for speech and mastication       CONSISTENCIES ADMINISTERED:    Consistency  Presentation  Findings Rosenbeck's Penetration/Aspiration Scale (PAS) Strategy Attempted    Thin (IDDSI 0) Method: Self-fed    Volume: 5ml x2 10 ml x2 Self-regulated cup sip x2 Rapid consecutive cup sip x2 Rapid consecutive straw sip x1    Projection: lateral view; AP view    Oral phase:  none to trace residue noted on the tongue which was  reduced by dry swallow and premature spillage to the bilateral pyriform sinuses    Pharyngeal phase:  none to trace residue noted in the pyriform sinuses which was  reduced by dry swallow and no vallecular residue noted    Esophageal screen: retrograde flow of bolus below the UES observed Best: (1) Material does not enter the airway    Worst: (1) Material does not enter the airway No strategies completed or needed   Nectar thick (mildly thick/IDDSI 2) Method: Self-fed    Volume: 5ml x2 10 ml x2    Projection: lateral view Oral phase:  trace residue noted on the tongue which was  reduced by  dry swallow    Pharyngeal phase:  trace residue noted in the pyriform sinuses which was  reduced by dry swallow and no vallecular residue noted     Esophogeal screen: delayed emptying/retention of bolus   Best: (1) Material does not enter the airway    Worst: (1) Material does not enter the airway   No strategies completed or needed   Puree (extremely thick/ IDDSI 4) Method: Self-fed    Volume: 5ml x1 10 ml x2    Projection: lateral view Oral phase:  none to trace residue noted on the tongue which was  reduced by dry swallow and premature spillage to the bilateral pyriform sinuses    Pharyngeal phase: no vallecular residue noted and no pyriform sinus residue noted    Esophageal screen: retrograde flow of bolus below the UES observed and delayed emptying/retention of bolus   Best: (1) Material does not enter the airway    Worst: (1) Material does not enter the airway   No strategies completed or needed   Mixed consistency (thin/ IDDSI 0 + soft and bite sized/ IDDSI 6) Method: Self-fed     Volume: 1 peach x1, 2 peaches in juice x2    Projection: lateral view   Oral phase: none to trace residue noted on the tongue which was reduced by dry swallow     Pharyngeal phase: no vallecular residue noted and no pyriform sinus residue noted    Esophageal screen: WFL Best: (1) Material does not enter the airway    Worst: (1) Material does not enter the airway   Multiple swallows per bolus-improved clearance noted   Solid (regular/ IDDSI 7) Method: Self-fed    Volume: bite x2    Projection: lateral view Oral phase: none to trace residue noted on the tongue which was reduced by dry swallow     Pharyngeal phase: no vallecular residue noted and no pyriform sinus residue noted    Esophageal screen: retrograde flow of bolus below the UES observed Best: (1) Material does not enter the airway    Worst: (1) Material does not enter the airway   Multiple swallows per bolus-improved clearance noted       Treatment   Total Treatment Time:  n/a  Patient educated regarding results and recommendations of the evaluation. See the recommendations section below.    Education: Plan of Care, role of SLP in care, and anatomy and physiology of swallow mechanism as it relates to MBSS findings and recommendations were discussed with the patient. Patient expressed understanding. All questions were answered.     Assessment     Kassy Delgado is a 68 y.o. female referred for Modified Barium Swallow Study with a medical diagnosis of Oropharyngeal dysphagia [R13.12]. The patient presents with Within Functional Limits oropharyngeal function as determined by the Dysphagia Outcome and Severity Scale (EVIN). Level 6: WFL/ Modified [place].    Modified Barium Swallow Study (MBSS) revealed oral phase characterized by adequate lingual and labial strength and range of motion for tongue control, bolus preparation and transport. Lip closure was adequate with no labial escape. Bolus prep and mastication was timely and efficient. Lingual motion was brisk for adequate bolus transport. There was none to trace oral residue on all consistencies which was eliminated with dry swallow. The swallow was initiated when the head of the bolus entered the pyriform sinuses.    Pharyngeal phase characterized by timely initiation of swallow across consistencies. The soft palate elevated for complete of the velopharyngeal port. During pharyngeal swallow, adequate base of tongue retraction, anterior hyoid excursion, laryngeal elevation, and pharyngeal stripping wave resulted in complete epiglottic inversion and UES opening with  no  penetration and no aspiration. No significant pharyngeal residue was observed in today's study. Patient noted with anterior cervical fusion hardware.     On esophageal screen, delayed esophageal emptying and retrograde flow below the pharyngo-esophageal segment  were noted. Further esophageal imaging including EGD and/or barium esophagram as well as  follow-up with GI is recommended.    Impressions: Patient presents with WFL oral and pharyngeal phases of the swallow. In consideration of the Dynamic Imaging Grade of Swallowing Toxicity (DIGEST) (Black et al, 2017), patient presents with preserved safety of swallow and preserved efficiency of swallow. Patient appears to be at low risk for aspiration related PNA in consideration of three pillars of aspiration pneumonia (Jessica, 2005) including her oral health status, overall health/immune status, and laryngeal vestibule closure/severity of dysphagia. However, unable to assess risk related to aspiration pneumonia cause by the aspiration of gastric content. No further speech therapy indicated    Functional Oral Intake Scale (FOIS)  The Functional Oral Intake Scale (FOIS) is an ordinal scale that is used to assess the current status and meaningful change in the oral intake. FOIS levels include:    TUBE DEPENDENT (levels 1-3) 1. No oral intake  2. Tube dependent with minimal/inconsistent oral intake  3. Tube supplements with consistent oral intake      TOTAL ORAL INTAKE (levels 4-7) 4. Total oral intake of a single consistency  5. Total oral intake of multiple consistencies requiring special preparation  6. Total oral intake with no special preparation, but must avoid specific foods or liquid items  7. Total oral intake with no restrictions     Patient is currently judged to be at FOIS level 7.      References:  YOBANI Lopez (2005, March). Pneumonia: Factors Beyond Aspiration. Perspectives in Swallowing and Swallowing Disorders (Dysphagia), 14, 10-16.  Claudette KA, Yu MP, Lakshmi DA, Rashawn JONESK, Yi HY, Mi RS, Stephen CD, Kevin SY, Hugh CP, Fredy J, Lazarus CL, May A, Ana J, Jeremy JW, Maxx HM, Franco JS. Dynamic Imaging Grade of Swallowing Toxicity (DIGEST): Scale development and validation. Cancer. 2017 Jan 1;123(1):62-70. doi: 10.1002/cncr.28508. Epub 2016 Aug 26. PMID: 84431396; PMCID:  [id number].    Recommendations:     Consistency Recommendations:  THIN liquids (IDDSI 0) and REGULAR consistencies (IDDSI 7).   Risk Management: use good oral hygiene , sit upright for all PO intake, increase physical mobility as tolerated, behavioral reflux precautions, alternate bites and sips, small bites and sips, multiple swallows per bolus, remain upright for at least 1-2 hours following any PO intake, eat small meals throughout the day to reduce discomfort associated with delayed emptying of the esophagus, and encourage volitional dry swallows and coughs throughout meals  Specialist Referrals: GI  Therapy: Dysphagia therapy is not recommended at this time.  Follow-up exam: Follow up swallow study is not indicated at this time.    Please contact [facility] at [phone] if there are questions re: the above or if we can be of additional service to this patient.    Therapist's Name:   Iram Villarreal CCC-SLP   Speech Language Pathologist    Date: 7/12/2023